# Patient Record
Sex: FEMALE | Race: OTHER
[De-identification: names, ages, dates, MRNs, and addresses within clinical notes are randomized per-mention and may not be internally consistent; named-entity substitution may affect disease eponyms.]

---

## 2020-04-09 ENCOUNTER — HOSPITAL ENCOUNTER (INPATIENT)
Dept: HOSPITAL 61 - ER | Age: 25
LOS: 3 days | Discharge: HOME | DRG: 419 | End: 2020-04-12
Attending: INTERNAL MEDICINE | Admitting: INTERNAL MEDICINE
Payer: SELF-PAY

## 2020-04-09 VITALS — SYSTOLIC BLOOD PRESSURE: 114 MMHG | DIASTOLIC BLOOD PRESSURE: 63 MMHG

## 2020-04-09 VITALS — DIASTOLIC BLOOD PRESSURE: 66 MMHG | SYSTOLIC BLOOD PRESSURE: 106 MMHG

## 2020-04-09 VITALS — SYSTOLIC BLOOD PRESSURE: 116 MMHG | DIASTOLIC BLOOD PRESSURE: 60 MMHG

## 2020-04-09 VITALS — DIASTOLIC BLOOD PRESSURE: 66 MMHG | SYSTOLIC BLOOD PRESSURE: 114 MMHG

## 2020-04-09 VITALS — DIASTOLIC BLOOD PRESSURE: 49 MMHG | SYSTOLIC BLOOD PRESSURE: 111 MMHG

## 2020-04-09 VITALS — SYSTOLIC BLOOD PRESSURE: 113 MMHG | DIASTOLIC BLOOD PRESSURE: 57 MMHG

## 2020-04-09 VITALS — DIASTOLIC BLOOD PRESSURE: 61 MMHG | SYSTOLIC BLOOD PRESSURE: 110 MMHG

## 2020-04-09 VITALS — DIASTOLIC BLOOD PRESSURE: 62 MMHG | SYSTOLIC BLOOD PRESSURE: 119 MMHG

## 2020-04-09 VITALS — DIASTOLIC BLOOD PRESSURE: 53 MMHG | SYSTOLIC BLOOD PRESSURE: 105 MMHG

## 2020-04-09 VITALS — BODY MASS INDEX: 30.49 KG/M2 | WEIGHT: 178.57 LBS | HEIGHT: 64 IN

## 2020-04-09 VITALS — SYSTOLIC BLOOD PRESSURE: 108 MMHG | DIASTOLIC BLOOD PRESSURE: 65 MMHG

## 2020-04-09 VITALS — DIASTOLIC BLOOD PRESSURE: 65 MMHG | SYSTOLIC BLOOD PRESSURE: 113 MMHG

## 2020-04-09 VITALS — SYSTOLIC BLOOD PRESSURE: 111 MMHG | DIASTOLIC BLOOD PRESSURE: 49 MMHG

## 2020-04-09 VITALS — SYSTOLIC BLOOD PRESSURE: 112 MMHG | DIASTOLIC BLOOD PRESSURE: 68 MMHG

## 2020-04-09 VITALS — DIASTOLIC BLOOD PRESSURE: 65 MMHG | SYSTOLIC BLOOD PRESSURE: 109 MMHG

## 2020-04-09 DIAGNOSIS — Z79.899: ICD-10-CM

## 2020-04-09 DIAGNOSIS — K76.0: ICD-10-CM

## 2020-04-09 DIAGNOSIS — Z82.49: ICD-10-CM

## 2020-04-09 DIAGNOSIS — K80.00: Primary | ICD-10-CM

## 2020-04-09 LAB
ALBUMIN SERPL-MCNC: 3.5 G/DL (ref 3.4–5)
ALBUMIN/GLOB SERPL: 1 {RATIO} (ref 1–1.7)
ALP SERPL-CCNC: 86 U/L (ref 46–116)
ALT SERPL-CCNC: 37 U/L (ref 14–59)
ANION GAP SERPL CALC-SCNC: 8 MMOL/L (ref 6–14)
APTT PPP: YELLOW S
AST SERPL-CCNC: 43 U/L (ref 15–37)
BACTERIA #/AREA URNS HPF: 0 /HPF
BASOPHILS # BLD AUTO: 0.1 X10^3/UL (ref 0–0.2)
BASOPHILS NFR BLD: 1 % (ref 0–3)
BILIRUB SERPL-MCNC: 0.2 MG/DL (ref 0.2–1)
BILIRUB UR QL STRIP: NEGATIVE
BUN SERPL-MCNC: 12 MG/DL (ref 7–20)
BUN/CREAT SERPL: 15 (ref 6–20)
CALCIUM SERPL-MCNC: 8.4 MG/DL (ref 8.5–10.1)
CHLORIDE SERPL-SCNC: 106 MMOL/L (ref 98–107)
CO2 SERPL-SCNC: 25 MMOL/L (ref 21–32)
CREAT SERPL-MCNC: 0.8 MG/DL (ref 0.6–1)
EOSINOPHIL NFR BLD: 0.2 X10^3/UL (ref 0–0.7)
EOSINOPHIL NFR BLD: 2 % (ref 0–3)
ERYTHROCYTE [DISTWIDTH] IN BLOOD BY AUTOMATED COUNT: 15.8 % (ref 11.5–14.5)
FIBRINOGEN PPP-MCNC: CLEAR MG/DL
GFR SERPLBLD BASED ON 1.73 SQ M-ARVRAT: 89.7 ML/MIN
GLOBULIN SER-MCNC: 3.4 G/DL (ref 2.2–3.8)
GLUCOSE SERPL-MCNC: 98 MG/DL (ref 70–99)
HCT VFR BLD CALC: 36.1 % (ref 36–47)
HGB BLD-MCNC: 11.9 G/DL (ref 12–15.5)
LIPASE: 184 U/L (ref 73–393)
LYMPHOCYTES # BLD: 2.8 X10^3/UL (ref 1–4.8)
LYMPHOCYTES NFR BLD AUTO: 34 % (ref 24–48)
MCH RBC QN AUTO: 28 PG (ref 25–35)
MCHC RBC AUTO-ENTMCNC: 33 G/DL (ref 31–37)
MCV RBC AUTO: 86 FL (ref 79–100)
MONO #: 0.6 X10^3/UL (ref 0–1.1)
MONOCYTES NFR BLD: 8 % (ref 0–9)
NEUT #: 4.5 X10^3/UL (ref 1.8–7.7)
NEUTROPHILS NFR BLD AUTO: 55 % (ref 31–73)
NITRITE UR QL STRIP: NEGATIVE
PH UR STRIP: 7.5 [PH]
PLATELET # BLD AUTO: 158 X10^3/UL (ref 140–400)
POTASSIUM SERPL-SCNC: 3.6 MMOL/L (ref 3.5–5.1)
PROT SERPL-MCNC: 6.9 G/DL (ref 6.4–8.2)
PROT UR STRIP-MCNC: NEGATIVE MG/DL
RBC # BLD AUTO: 4.22 X10^6/UL (ref 3.5–5.4)
RBC #/AREA URNS HPF: 0 /HPF (ref 0–2)
SODIUM SERPL-SCNC: 139 MMOL/L (ref 136–145)
SQUAMOUS #/AREA URNS LPF: (no result) /LPF
U PREG PATIENT: NEGATIVE
UROBILINOGEN UR-MCNC: 0.2 MG/DL
WBC # BLD AUTO: 8.1 X10^3/UL (ref 4–11)
WBC #/AREA URNS HPF: (no result) /HPF (ref 0–4)

## 2020-04-09 PROCEDURE — G0378 HOSPITAL OBSERVATION PER HR: HCPCS

## 2020-04-09 PROCEDURE — 99285 EMERGENCY DEPT VISIT HI MDM: CPT

## 2020-04-09 PROCEDURE — BF101ZZ FLUOROSCOPY OF BILE DUCTS USING LOW OSMOLAR CONTRAST: ICD-10-PCS | Performed by: SURGERY

## 2020-04-09 PROCEDURE — 85379 FIBRIN DEGRADATION QUANT: CPT

## 2020-04-09 PROCEDURE — 85025 COMPLETE CBC W/AUTO DIFF WBC: CPT

## 2020-04-09 PROCEDURE — 88304 TISSUE EXAM BY PATHOLOGIST: CPT

## 2020-04-09 PROCEDURE — 74300 X-RAY BILE DUCTS/PANCREAS: CPT

## 2020-04-09 PROCEDURE — 96374 THER/PROPH/DIAG INJ IV PUSH: CPT

## 2020-04-09 PROCEDURE — 93005 ELECTROCARDIOGRAM TRACING: CPT

## 2020-04-09 PROCEDURE — 81025 URINE PREGNANCY TEST: CPT

## 2020-04-09 PROCEDURE — 80053 COMPREHEN METABOLIC PANEL: CPT

## 2020-04-09 PROCEDURE — G0238 OTH RESP PROC, INDIV: HCPCS

## 2020-04-09 PROCEDURE — 87340 HEPATITIS B SURFACE AG IA: CPT

## 2020-04-09 PROCEDURE — 86038 ANTINUCLEAR ANTIBODIES: CPT

## 2020-04-09 PROCEDURE — 86705 HEP B CORE ANTIBODY IGM: CPT

## 2020-04-09 PROCEDURE — A7015 AEROSOL MASK USED W NEBULIZE: HCPCS

## 2020-04-09 PROCEDURE — 86709 HEPATITIS A IGM ANTIBODY: CPT

## 2020-04-09 PROCEDURE — 81001 URINALYSIS AUTO W/SCOPE: CPT

## 2020-04-09 PROCEDURE — 0FT44ZZ RESECTION OF GALLBLADDER, PERCUTANEOUS ENDOSCOPIC APPROACH: ICD-10-PCS | Performed by: SURGERY

## 2020-04-09 PROCEDURE — 96375 TX/PRO/DX INJ NEW DRUG ADDON: CPT

## 2020-04-09 PROCEDURE — 80076 HEPATIC FUNCTION PANEL: CPT

## 2020-04-09 PROCEDURE — 83690 ASSAY OF LIPASE: CPT

## 2020-04-09 PROCEDURE — 86803 HEPATITIS C AB TEST: CPT

## 2020-04-09 PROCEDURE — 36415 COLL VENOUS BLD VENIPUNCTURE: CPT

## 2020-04-09 PROCEDURE — 76705 ECHO EXAM OF ABDOMEN: CPT

## 2020-04-09 RX ADMIN — HYDROMORPHONE HYDROCHLORIDE PRN MG: 2 INJECTION INTRAMUSCULAR; INTRAVENOUS; SUBCUTANEOUS at 19:29

## 2020-04-09 RX ADMIN — BACITRACIN SCH MLS/HR: 5000 INJECTION, POWDER, FOR SOLUTION INTRAMUSCULAR at 12:21

## 2020-04-09 RX ADMIN — SODIUM CHLORIDE AND POTASSIUM CHLORIDE SCH MLS/HR: 4.5; 1.49 INJECTION, SOLUTION INTRAVENOUS at 13:37

## 2020-04-09 RX ADMIN — MORPHINE SULFATE PRN MG: 4 INJECTION, SOLUTION INTRAMUSCULAR; INTRAVENOUS at 05:17

## 2020-04-09 RX ADMIN — PIPERACILLIN SODIUM AND TAZOBACTAM SODIUM SCH MLS/HR: 3; .375 INJECTION, POWDER, LYOPHILIZED, FOR SOLUTION INTRAVENOUS at 17:52

## 2020-04-09 RX ADMIN — MORPHINE SULFATE PRN MG: 4 INJECTION, SOLUTION INTRAMUSCULAR; INTRAVENOUS at 03:08

## 2020-04-09 RX ADMIN — PIPERACILLIN SODIUM AND TAZOBACTAM SODIUM SCH MLS/HR: 3; .375 INJECTION, POWDER, LYOPHILIZED, FOR SOLUTION INTRAVENOUS at 03:05

## 2020-04-09 RX ADMIN — PIPERACILLIN SODIUM AND TAZOBACTAM SODIUM SCH MLS/HR: 3; .375 INJECTION, POWDER, LYOPHILIZED, FOR SOLUTION INTRAVENOUS at 06:41

## 2020-04-09 RX ADMIN — OXYCODONE HYDROCHLORIDE AND ACETAMINOPHEN PRN TAB: 5; 325 TABLET ORAL at 21:57

## 2020-04-09 RX ADMIN — BACITRACIN SCH MLS/HR: 5000 INJECTION, POWDER, FOR SOLUTION INTRAMUSCULAR at 06:40

## 2020-04-09 RX ADMIN — BACITRACIN SCH MLS/HR: 5000 INJECTION, POWDER, FOR SOLUTION INTRAMUSCULAR at 03:05

## 2020-04-09 RX ADMIN — BACITRACIN SCH MLS/HR: 5000 INJECTION, POWDER, FOR SOLUTION INTRAMUSCULAR at 21:52

## 2020-04-09 RX ADMIN — FENTANYL CITRATE PRN MCG: 50 INJECTION INTRAMUSCULAR; INTRAVENOUS at 12:29

## 2020-04-09 RX ADMIN — DOCUSATE SODIUM SCH MG: 100 CAPSULE, LIQUID FILLED ORAL at 21:52

## 2020-04-09 RX ADMIN — HYDROMORPHONE HYDROCHLORIDE PRN MG: 2 INJECTION INTRAMUSCULAR; INTRAVENOUS; SUBCUTANEOUS at 22:54

## 2020-04-09 RX ADMIN — FENTANYL CITRATE PRN MCG: 50 INJECTION INTRAMUSCULAR; INTRAVENOUS at 10:23

## 2020-04-09 RX ADMIN — PIPERACILLIN SODIUM AND TAZOBACTAM SODIUM SCH MLS/HR: 3; .375 INJECTION, POWDER, LYOPHILIZED, FOR SOLUTION INTRAVENOUS at 23:28

## 2020-04-09 RX ADMIN — ONDANSETRON PRN MG: 2 INJECTION INTRAMUSCULAR; INTRAVENOUS at 13:47

## 2020-04-09 RX ADMIN — PIPERACILLIN SODIUM AND TAZOBACTAM SODIUM SCH MLS/HR: 3; .375 INJECTION, POWDER, LYOPHILIZED, FOR SOLUTION INTRAVENOUS at 12:06

## 2020-04-09 RX ADMIN — HYDROMORPHONE HYDROCHLORIDE PRN MG: 2 INJECTION INTRAMUSCULAR; INTRAVENOUS; SUBCUTANEOUS at 13:37

## 2020-04-09 RX ADMIN — FENTANYL CITRATE PRN MCG: 50 INJECTION INTRAMUSCULAR; INTRAVENOUS at 12:49

## 2020-04-09 RX ADMIN — ENOXAPARIN SODIUM SCH MG: 40 INJECTION SUBCUTANEOUS at 21:51

## 2020-04-09 RX ADMIN — SODIUM CHLORIDE AND POTASSIUM CHLORIDE SCH MLS/HR: 4.5; 1.49 INJECTION, SOLUTION INTRAVENOUS at 23:30

## 2020-04-09 NOTE — PHYS DOC
Past Medical History


Past Medical History:  No Pertinent History


Past Surgical History:  No Surgical History


Smoking Status:  Never Smoker


Alcohol Use:  None





General Adult


EDM:


Chief Complaint:  CHEST PAIN





HPI:


HPI:





Patient is a 24  year old female who is 7 months postpartum who presents with 

acute onset epigastric/right upper quadrant. Rating to right shoulder blade 

starting every 5 minutes prior to ED arrival. Patient was sitting time symptoms 

began. Pain is described as sharp, rated moderate to severe and is associated 

with nausea and vomiting. No fevers chills or sweats. No shortness of breath. 

Patient ate a burger hours prior to symptom onset. Denies history of peptic 

ulcer disease, gallbladder disease or gastritis. No other acute symptoms or 

complaints. []





Review of Systems:


Review of Systems:


ROS as per HPI.  All othe ROS are negative.





Heart Score:


Risk Factors:


Risk Factors:  DM, Current or recent (<one month) smoker, HTN, HLP, family 

history of CAD, obesity.


Risk Scores:


Score 0 - 3:  2.5% MACE over next 6 weeks - Discharge Home


Score 4 - 6:  20.3% MACE over next 6 weeks - Admit for Clinical Observation


Score 7 - 10:  72.7% MACE over next 6 weeks - Early Invasive Strategies





Current Medications:





Current Medications








 Medications


  (Trade)  Dose


 Ordered  Sig/Mello  Start Time


 Stop Time Status Last Admin


Dose Admin


 


 Famotidine


  (Pepcid)  20 mg  1X  ONCE  4/9/20 01:30


 4/9/20 01:31 DC 4/9/20 01:21


20 MG


 


 Fentanyl Citrate


  (Fentanyl 2ml


 Vial)  75 mcg  1X  ONCE  4/9/20 01:30


 4/9/20 01:34 DC 4/9/20 01:30


75 MCG


 


 Morphine Sulfate


  (Morphine


 Sulfate)  4 mg  PRN Q2HR  PRN  4/9/20 03:00


 4/10/20 02:59   





 


 Multi-Ingredient


 Mouthwash/Gargle


  (Gi Cocktail)  20 ml  1X  ONCE  4/9/20 01:30


 4/9/20 01:31 DC 4/9/20 01:21


20 ML


 


 Ondansetron HCl


  (Zofran Odt)  4 mg  1X  ONCE  4/9/20 01:30


 4/9/20 01:31 DC 4/9/20 01:21


4 MG


 


 Ondansetron HCl


  (Zofran)  4 mg  PRN Q8HRS  PRN  4/9/20 03:00


 4/10/20 02:59   





 


 Piperacillin Sod/


 Tazobactam Sod


  (Zosyn Per


 Pharmacy)  1 each  PRN DAILY  PRN  4/9/20 03:00


     





 


 Piperacillin Sod/


 Tazobactam Sod


 3.375 gm/Sodium


 Chloride  50 ml @ 


 100 mls/hr  Q6HRS  4/9/20 03:00


    4/9/20 03:05


100 MLS/HR


 


 Sodium Chloride  1,000 ml @ 


 125 mls/hr  Q8H  4/9/20 03:00


 4/10/20 02:59  4/9/20 03:05


125 MLS/HR











Allergies:


Allergies:





Allergies








Coded Allergies Type Severity Reaction Last Updated Verified


 


  No Known Drug Allergies    4/9/20 No











Physical Exam:


PE:





Constitutional: Well developed, well nourished, no acute distress, non-toxic 

appearance. []


HENT: Normocephalic, atraumatic, bilateral external ears normal, oropharynx 

moist, no oral exudates, nose normal. []


Eyes: PERRLA, EOMI, conjunctiva normal, no discharge. [] 


Neck: Normal range of motion, no tenderness. [] 


Cardiovascular:Heart rate regular rhythhm []


Lungs & Thorax:  Bilateral breath sounds clear to auscultation []


Abdomen: Bowel sounds normal, soft, RUQ tenderness to palpation.. [] 


Skin: Warm, dry, no erythema, no rash. [] 


Back: No tenderness, no CVA tenderness. [] 


Extremities: No tenderness, no cyanosis, no clubbing, ROM intact, no edema. [] 


Neurologic: Alert and oriented X 3, normal motor function, normal sensory 

function, no focal deficits noted. []


Psychologic: Affect normal, judgement normal, mood normal. []





Current Patient Data:


Labs:





                                Laboratory Tests








Test


 4/9/20


01:12 4/9/20


01:35


 


White Blood Count


 8.1 x10^3/uL


(4.0-11.0) 





 


Red Blood Count


 4.22 x10^6/uL


(3.50-5.40) 





 


Hemoglobin


 11.9 g/dL


(12.0-15.5)  L 





 


Hematocrit


 36.1 %


(36.0-47.0) 





 


Mean Corpuscular Volume


 86 fL ()


 





 


Mean Corpuscular Hemoglobin 28 pg (25-35)   


 


Mean Corpuscular Hemoglobin


Concent 33 g/dL


(31-37) 





 


Red Cell Distribution Width


 15.8 %


(11.5-14.5)  H 





 


Platelet Count


 158 x10^3/uL


(140-400) 





 


Neutrophils (%) (Auto) 55 % (31-73)   


 


Lymphocytes (%) (Auto) 34 % (24-48)   


 


Monocytes (%) (Auto) 8 % (0-9)   


 


Eosinophils (%) (Auto) 2 % (0-3)   


 


Basophils (%) (Auto) 1 % (0-3)   


 


Neutrophils # (Auto)


 4.5 x10^3/uL


(1.8-7.7) 





 


Lymphocytes # (Auto)


 2.8 x10^3/uL


(1.0-4.8) 





 


Monocytes # (Auto)


 0.6 x10^3/uL


(0.0-1.1) 





 


Eosinophils # (Auto)


 0.2 x10^3/uL


(0.0-0.7) 





 


Basophils # (Auto)


 0.1 x10^3/uL


(0.0-0.2) 





 


D-Dimer (Kassi)


 < 0.27


ug/mlFEU 





 


Sodium Level


 139 mmol/L


(136-145) 





 


Potassium Level


 3.6 mmol/L


(3.5-5.1) 





 


Chloride Level


 106 mmol/L


() 





 


Carbon Dioxide Level


 25 mmol/L


(21-32) 





 


Anion Gap 8 (6-14)   


 


Blood Urea Nitrogen


 12 mg/dL


(7-20) 





 


Creatinine


 0.8 mg/dL


(0.6-1.0) 





 


Estimated GFR


(Cockcroft-Gault) 89.7  


 





 


BUN/Creatinine Ratio 15 (6-20)   


 


Glucose Level


 98 mg/dL


(70-99) 





 


Calcium Level


 8.4 mg/dL


(8.5-10.1)  L 





 


Total Bilirubin


 0.2 mg/dL


(0.2-1.0) 





 


Aspartate Amino Transferase


(AST) 43 U/L (15-37)


H 





 


Alanine Aminotransferase (ALT)


 37 U/L (14-59)


 





 


Alkaline Phosphatase


 86 U/L


() 





 


Total Protein


 6.9 g/dL


(6.4-8.2) 





 


Albumin


 3.5 g/dL


(3.4-5.0) 





 


Albumin/Globulin Ratio 1.0 (1.0-1.7)   


 


Lipase


 184 U/L


() 





 


Urine Collection Type  Unknown  


 


Urine Color  Yellow  


 


Urine Clarity  Clear  


 


Urine pH


 


 7.5 (<5.0-8.0)





 


Urine Specific Gravity


 


 1.010


(1.000-1.030)


 


Urine Protein


 


 Negative mg/dL


(NEG-TRACE)


 


Urine Glucose (UA)


 


 Negative mg/dL


(NEG)


 


Urine Ketones (Stick)


 


 Negative mg/dL


(NEG)


 


Urine Blood


 


 Negative (NEG)





 


Urine Nitrite


 


 Negative (NEG)





 


Urine Bilirubin


 


 Negative (NEG)





 


Urine Urobilinogen Dipstick


 


 0.2 mg/dL (0.2


mg/dL)


 


Urine Leukocyte Esterase


 


 Negative (NEG)





 


Urine RBC  0 /HPF (0-2)  


 


Urine WBC


 


 1-4 /HPF (0-4)





 


Urine Squamous Epithelial


Cells 


 Many /LPF  





 


Urine Bacteria


 


 0 /HPF (0-FEW)








                                Laboratory Tests


4/9/20 01:12








                                Laboratory Tests


4/9/20 01:12








Vital Signs:





                                   Vital Signs








  Date Time  Temp Pulse Resp B/P (MAP) Pulse Ox O2 Delivery O2 Flow Rate FiO2


 


4/9/20 01:30   18  100 Room Air  


 


4/9/20 01:09 98.2 99  115/60 (78)    





 98.2       











EKG:


EKG:


[ekg: reviewed]





Radiology/Procedures:


Radiology/Procedures:


[US abd limited: Large: Moderate stones, CBD 7.4 mm, wall thickness with. 

Cholecystic fluid present.]





Course & Med Decision Making:


Course & Med Decision Making


Pertinent Labs and Imaging studies reviewed. (See chart for details)





[Acute cholecystitis. IV antibiotics, repeat pain medications given. Will admit 

to the hospitalist service with general surgical consult.]





Dragon Disclaimer:


Dragon Disclaimer:


This electronic medical record was generated, in whole or in part, using a voice

recognition dictation system.





Departure


Departure


Impression:  


   Primary Impression:  


   Acute cholecystitis


Disposition:  09 ADMITTED AS INPATIENT


Condition:  LEFT WITHOUT BEING SEEN


Referrals:  


NO PCP (PCP)











PAVAN JULIEN DO                    Apr 9, 2020 03:14

## 2020-04-09 NOTE — PDOC2
LEON BRAVO APRN 4/9/20 0857:


CONSULT


Date of Consult


Date of Consult


DATE: 4/9/20 


TIME: 08:49





Reason for Consult


Reason for Consult:


cholecystitis





Referring Physician


Referring Physician:


ER





Identification/Chief Complaint


Chief Complaint


abdominal pain





Source


Source:  Chart review, Patient





History of Present Illness


Reason for Visit:


RUQ pain started yesterday, radiated to back.  Associated nausea and emesis.  + 

chills, has happened before, however not this severe or long lasting





Past Medical History


Past Medical History


no pertinent hx





Past Surgical History


Past Surgical History:  No pertinent history





Family History


Family History:  Other (noncontributory )





Social History


No


ALCOHOL:  none


Drugs:  None





Current Medications


Current Medications





Current Medications


Famotidine (Pepcid) 20 mg 1X  ONCE PO  Last administered on 4/9/20at 01:21;  

Start 4/9/20 at 01:30;  Stop 4/9/20 at 01:31;  Status DC


Ondansetron HCl (Zofran Odt) 4 mg 1X  ONCE PO  Last administered on 4/9/20at 

01:21;  Start 4/9/20 at 01:30;  Stop 4/9/20 at 01:31;  Status DC


Multi-Ingredient Mouthwash/Gargle (Gi Cocktail) 20 ml 1X  ONCE SWSW  Last 

administered on 4/9/20at 01:21;  Start 4/9/20 at 01:30;  Stop 4/9/20 at 01:31;  

Status DC


Fentanyl Citrate (Fentanyl 2ml Vial) 75 mcg 1X  ONCE IVP  Last administered on 

4/9/20at 01:30;  Start 4/9/20 at 01:30;  Stop 4/9/20 at 01:34;  Status DC


Piperacillin Sod/ Tazobactam Sod (Zosyn Per Pharmacy) 1 each PRN DAILY  PRN [facility] 

SEE COMMENTS;  Start 4/9/20 at 03:00


Piperacillin Sod/ Tazobactam Sod 3.375 gm/Sodium Chloride 50 ml @  100 mls/hr 

Q6HRS IV  Last administered on 4/9/20at 06:41;  Start 4/9/20 at 03:00


Ondansetron HCl (Zofran) 4 mg PRN Q8HRS  PRN IV NAUSEA/VOMITING 1ST CHOICE Last 

administered on 4/9/20at 03:07;  Start 4/9/20 at 03:00;  Stop 4/10/20 at 02:59


Morphine Sulfate (Morphine Sulfate) 4 mg PRN Q2HR  PRN IV SEVERE PAIN 7-10 Last 

administered on 4/9/20at 05:17;  Start 4/9/20 at 03:00;  Stop 4/10/20 at 02:59


Sodium Chloride 1,000 ml @  125 mls/hr Q8H IV  Last administered on 4/9/20at 

06:40;  Start 4/9/20 at 03:00;  Stop 4/10/20 at 02:59


Ondansetron HCl (Zofran) 4 mg PRN Q6HRS  PRN IV NAUSEA/VOMITING;  Start 4/9/20 

at 08:45;  Stop 4/9/20 at 20:00


Fentanyl Citrate (Fentanyl 2ml Vial) 25 mcg PRN Q5MIN  PRN IV MILD PAIN 1-3;  

Start 4/9/20 at 08:45;  Stop 4/9/20 at 20:00


Fentanyl Citrate (Fentanyl 2ml Vial) 50 mcg PRN Q5MIN  PRN IV MODERATE TO SEVERE

PAIN;  Start 4/9/20 at 08:45;  Stop 4/9/20 at 20:00


Morphine Sulfate (Morphine Sulfate) 1 mg PRN Q10MIN  PRN IV SEVERE PAIN 7-10;  

Start 4/9/20 at 08:45;  Stop 4/10/20 at 08:44;  Status UNV


Ringer's Solution 1,000 ml @  30 mls/hr Q24H IV ;  Start 4/9/20 at 08:43;  Stop 

4/9/20 at 20:42;  Status UNV


Hydromorphone HCl (Dilaudid) 0.5 mg PRN Q10MIN  PRN IV SEV PAIN, Second choice; 

Start 4/9/20 at 08:45;  Stop 4/10/20 at 08:44;  Status UNV


Prochlorperazine Edisylate (Compazine) 5 mg PACU PRN  PRN IV NAUSEA, MRX1;  

Start 4/9/20 at 08:45;  Stop 4/10/20 at 08:44;  Status UNV





Allergies


Allergies:  


Coded Allergies:  


     No Known Drug Allergies (Unverified , 4/9/20)





ROS


General:  YES: Chills; 


   No: Other (fevers )


PSYCHOLOGICAL ROS:  No: Anxiety, Depression


Eyes:  No Blurry vision, No Double vision


HEENT:  No: Heacaches, Sore Throat


Hematological and Lymphatic:  No: Bleeding Problems, Blood Clots


Respiratory:  No: Cough, SOB with excertion


Cardiovascular:  No Chest Pain, No Palpitations


Gastrointestinal:  Yes Other (see hpi)


Genitourinary:  No Dysuria, No Hematuria


Musculoskeletal:  No Joint Pain, No Muscle Pain


Neurological:  No Impaired Coord/balance, No Numbness/Tingling


Skin:  No Pruritus, No Rash





Physical Exam


General:  Alert, Oriented X3, Cooperative, No acute distress


HEENT:  PERRLA, Mucous membr. moist/pink


Lungs:  Clear to auscultation, Normal air movement


Heart:  Regular rate, Normal S1, Normal S2


Abdomen:  Soft, Other (RUQ TTP)


Extremities:  No clubbing, No cyanosis


Skin:  No rashes, No breakdown


Neuro:  Normal gait, Normal speech


Psych/Mental Status:  Mental status NL, Mood NL


MUSCULOSKELETAL:  No deformity, No swelling





Vitals


VITALS





Vital Signs








  Date Time  Temp Pulse Resp B/P (MAP) Pulse Ox O2 Delivery O2 Flow Rate FiO2


 


4/9/20 07:30 98.2 85 18 114/63 (80) 97 Room Air  





 98.2       











Labs


Labs





Laboratory Tests








Test


 4/9/20


01:12 4/9/20


01:35


 


White Blood Count


 8.1 x10^3/uL


(4.0-11.0) 





 


Red Blood Count


 4.22 x10^6/uL


(3.50-5.40) 





 


Hemoglobin


 11.9 g/dL


(12.0-15.5) 





 


Hematocrit


 36.1 %


(36.0-47.0) 





 


Mean Corpuscular Volume 86 fL ()  


 


Mean Corpuscular Hemoglobin 28 pg (25-35)  


 


Mean Corpuscular Hemoglobin


Concent 33 g/dL


(31-37) 





 


Red Cell Distribution Width


 15.8 %


(11.5-14.5) 





 


Platelet Count


 158 x10^3/uL


(140-400) 





 


Neutrophils (%) (Auto) 55 % (31-73)  


 


Lymphocytes (%) (Auto) 34 % (24-48)  


 


Monocytes (%) (Auto) 8 % (0-9)  


 


Eosinophils (%) (Auto) 2 % (0-3)  


 


Basophils (%) (Auto) 1 % (0-3)  


 


Neutrophils # (Auto)


 4.5 x10^3/uL


(1.8-7.7) 





 


Lymphocytes # (Auto)


 2.8 x10^3/uL


(1.0-4.8) 





 


Monocytes # (Auto)


 0.6 x10^3/uL


(0.0-1.1) 





 


Eosinophils # (Auto)


 0.2 x10^3/uL


(0.0-0.7) 





 


Basophils # (Auto)


 0.1 x10^3/uL


(0.0-0.2) 





 


D-Dimer (Kassi)


 < 0.27


ug/mlFEU 





 


Sodium Level


 139 mmol/L


(136-145) 





 


Potassium Level


 3.6 mmol/L


(3.5-5.1) 





 


Chloride Level


 106 mmol/L


() 





 


Carbon Dioxide Level


 25 mmol/L


(21-32) 





 


Anion Gap 8 (6-14)  


 


Blood Urea Nitrogen


 12 mg/dL


(7-20) 





 


Creatinine


 0.8 mg/dL


(0.6-1.0) 





 


Estimated GFR


(Cockcroft-Gault) 89.7 


 





 


BUN/Creatinine Ratio 15 (6-20)  


 


Glucose Level


 98 mg/dL


(70-99) 





 


Calcium Level


 8.4 mg/dL


(8.5-10.1) 





 


Total Bilirubin


 0.2 mg/dL


(0.2-1.0) 





 


Aspartate Amino Transf


(AST/SGOT) 43 U/L (15-37) 


 





 


Alanine Aminotransferase


(ALT/SGPT) 37 U/L (14-59) 


 





 


Alkaline Phosphatase


 86 U/L


() 





 


Total Protein


 6.9 g/dL


(6.4-8.2) 





 


Albumin


 3.5 g/dL


(3.4-5.0) 





 


Albumin/Globulin Ratio 1.0 (1.0-1.7)  


 


Lipase


 184 U/L


() 





 


Urine Collection Type  Unknown 


 


Urine Color  Yellow 


 


Urine Clarity  Clear 


 


Urine pH  7.5 (<5.0-8.0) 


 


Urine Specific Gravity


 


 1.010


(1.000-1.030)


 


Urine Protein


 


 Negative mg/dL


(NEG-TRACE)


 


Urine Glucose (UA)


 


 Negative mg/dL


(NEG)


 


Urine Ketones (Stick)


 


 Negative mg/dL


(NEG)


 


Urine Blood  Negative (NEG) 


 


Urine Nitrite  Negative (NEG) 


 


Urine Bilirubin  Negative (NEG) 


 


Urine Urobilinogen Dipstick


 


 0.2 mg/dL (0.2


mg/dL)


 


Urine Leukocyte Esterase  Negative (NEG) 


 


Urine RBC  0 /HPF (0-2) 


 


Urine WBC  1-4 /HPF (0-4) 


 


Urine Squamous Epithelial


Cells 


 Many /LPF 





 


Urine Bacteria  0 /HPF (0-FEW) 








Laboratory Tests








Test


 4/9/20


01:12 4/9/20


01:35


 


White Blood Count


 8.1 x10^3/uL


(4.0-11.0) 





 


Red Blood Count


 4.22 x10^6/uL


(3.50-5.40) 





 


Hemoglobin


 11.9 g/dL


(12.0-15.5) 





 


Hematocrit


 36.1 %


(36.0-47.0) 





 


Mean Corpuscular Volume 86 fL ()  


 


Mean Corpuscular Hemoglobin 28 pg (25-35)  


 


Mean Corpuscular Hemoglobin


Concent 33 g/dL


(31-37) 





 


Red Cell Distribution Width


 15.8 %


(11.5-14.5) 





 


Platelet Count


 158 x10^3/uL


(140-400) 





 


Neutrophils (%) (Auto) 55 % (31-73)  


 


Lymphocytes (%) (Auto) 34 % (24-48)  


 


Monocytes (%) (Auto) 8 % (0-9)  


 


Eosinophils (%) (Auto) 2 % (0-3)  


 


Basophils (%) (Auto) 1 % (0-3)  


 


Neutrophils # (Auto)


 4.5 x10^3/uL


(1.8-7.7) 





 


Lymphocytes # (Auto)


 2.8 x10^3/uL


(1.0-4.8) 





 


Monocytes # (Auto)


 0.6 x10^3/uL


(0.0-1.1) 





 


Eosinophils # (Auto)


 0.2 x10^3/uL


(0.0-0.7) 





 


Basophils # (Auto)


 0.1 x10^3/uL


(0.0-0.2) 





 


D-Dimer (Kassi)


 < 0.27


ug/mlFEU 





 


Sodium Level


 139 mmol/L


(136-145) 





 


Potassium Level


 3.6 mmol/L


(3.5-5.1) 





 


Chloride Level


 106 mmol/L


() 





 


Carbon Dioxide Level


 25 mmol/L


(21-32) 





 


Anion Gap 8 (6-14)  


 


Blood Urea Nitrogen


 12 mg/dL


(7-20) 





 


Creatinine


 0.8 mg/dL


(0.6-1.0) 





 


Estimated GFR


(Cockcroft-Gault) 89.7 


 





 


BUN/Creatinine Ratio 15 (6-20)  


 


Glucose Level


 98 mg/dL


(70-99) 





 


Calcium Level


 8.4 mg/dL


(8.5-10.1) 





 


Total Bilirubin


 0.2 mg/dL


(0.2-1.0) 





 


Aspartate Amino Transf


(AST/SGOT) 43 U/L (15-37) 


 





 


Alanine Aminotransferase


(ALT/SGPT) 37 U/L (14-59) 


 





 


Alkaline Phosphatase


 86 U/L


() 





 


Total Protein


 6.9 g/dL


(6.4-8.2) 





 


Albumin


 3.5 g/dL


(3.4-5.0) 





 


Albumin/Globulin Ratio 1.0 (1.0-1.7)  


 


Lipase


 184 U/L


() 





 


Urine Collection Type  Unknown 


 


Urine Color  Yellow 


 


Urine Clarity  Clear 


 


Urine pH  7.5 (<5.0-8.0) 


 


Urine Specific Gravity


 


 1.010


(1.000-1.030)


 


Urine Protein


 


 Negative mg/dL


(NEG-TRACE)


 


Urine Glucose (UA)


 


 Negative mg/dL


(NEG)


 


Urine Ketones (Stick)


 


 Negative mg/dL


(NEG)


 


Urine Blood  Negative (NEG) 


 


Urine Nitrite  Negative (NEG) 


 


Urine Bilirubin  Negative (NEG) 


 


Urine Urobilinogen Dipstick


 


 0.2 mg/dL (0.2


mg/dL)


 


Urine Leukocyte Esterase  Negative (NEG) 


 


Urine RBC  0 /HPF (0-2) 


 


Urine WBC  1-4 /HPF (0-4) 


 


Urine Squamous Epithelial


Cells 


 Many /LPF 





 


Urine Bacteria  0 /HPF (0-FEW) 











Assessment/Plan


Assessment/Plan


cholecystitis


plan lap maicol





RAJ SUH MD 4/9/20 0910:


CONSULT


Assessment/Plan


Assessment/Plan


I saw, interviewed and examined Loreta. Agree with above assessment. Explained

risks of cholecystectomy including but not limited to bleeding, infection, 

injury to surrounding structures requiring more surgery later, possible open 

procedure, need for a drain. 


Discussed non operative treatment. Questions answered. She would like to proceed





To OR today





Thanks for consult











LEON BRAVO             Apr 9, 2020 08:57


RAJ SUH MD                 Apr 9, 2020 09:10

## 2020-04-09 NOTE — EKG
[facility]

              [address] [place] [address]

Test Date:    2020               Test Time:    00:57:09

Pat Name:     SINDHU KAHN    Department:   

Patient ID:   [id number]           Room:         [facility]

Gender:       F                        Technician:   

:          [id number]               Requested By: PAVAN JULIEN

Order Number: [id number]           Reading MD:   Tor Naidu

                                 Measurements

Intervals                              Axis          

Rate:         105                      P:            61

MA:           136                      QRS:          7

QRSD:         86                       T:            28

QT:           346                                    

QTc:          461                                    

                           Interpretive Statements

SINUS TACHYCARDIA

NONSPECIFIC ST-T WAVE CHANGES.



Electronically Signed On 2020 9:28:04 CDT by Tor Naidu

## 2020-04-09 NOTE — RAD
CHOLANGIOGRAM INTRAOPERATIVE

 

History: Cholecystectomy.

 

Comparison: Ultrasound April 9, 2020

 

Technique/findings:

Contrast opacification of the cystic duct extending into the common bile 

duct and intrahepatic ducts. Normal tapering of the distal common bile 

duct. Contrast noted within the small bowel. Apparent filling defect 

within the distal common bile duct resolves on subsequent imaging.

 

See procedure note for further details.

 

Fluoroscopy time: 34.4 seconds

Number of fluoroscopic images: 7

 

Impression: 

1.  Intraoperative cholangiogram. No evidence of common bile duct 

obstruction.

 

Electronically signed by: Terrell Cota DO (4/9/2020 11:51 AM) [id number]

## 2020-04-09 NOTE — PDOC1
History and Physical


Date of Admission


Date of Admission


DATE: 4/9/20 


TIME: 11:31





Identification/Chief Complaint


Chief Complaint


 seen in er , 24  year old female who is 7 months postpartum who presented with 

acute onset epigastric/right upper quadrant.// shoulder blade starting every 5 

minutes prior to ED arrival.  Pain is described as sharp, rated moderate to 

severe and is associated with nausea and vomiting. No fevers chills or sweats. 

No shortness of breath.





She  had a burger hours prior to symptom onset.





Past Medical History


Past Medical History:  No Pertinent History


Past Surgical History:  No Surgical History


Smoking Status:  Never Smoker


Alcohol Use:  None








fhx htn


Cardiovascular:  No pertinent hx


GI:  No pertinent hx





Past Surgical History


Past Surgical History:  No pertinent history





Family History


Family History:  Hypertension, Other (noncontributory )





Social History


Smoke:  No


ALCOHOL:  none


Drugs:  None





Current Medications


Current Medications





Current Medications


Famotidine (Pepcid) 20 mg 1X  ONCE PO  Last administered on 4/9/20at 01:21;  

Start 4/9/20 at 01:30;  Stop 4/9/20 at 01:31;  Status DC


Ondansetron HCl (Zofran Odt) 4 mg 1X  ONCE PO  Last administered on 4/9/20at 

01:21;  Start 4/9/20 at 01:30;  Stop 4/9/20 at 01:31;  Status DC


Multi-Ingredient Mouthwash/Gargle (Gi Cocktail) 20 ml 1X  ONCE SWSW  Last 

administered on 4/9/20at 01:21;  Start 4/9/20 at 01:30;  Stop 4/9/20 at 01:31;  

Status DC


Fentanyl Citrate (Fentanyl 2ml Vial) 75 mcg 1X  ONCE IVP  Last administered on 

4/9/20at 01:30;  Start 4/9/20 at 01:30;  Stop 4/9/20 at 01:34;  Status DC


Piperacillin Sod/ Tazobactam Sod (Zosyn Per Pharmacy) 1 each PRN DAILY  PRN [facility] 

SEE COMMENTS;  Start 4/9/20 at 03:00


Piperacillin Sod/ Tazobactam Sod 3.375 gm/Sodium Chloride 50 ml @  100 mls/hr 

Q6HRS IV  Last administered on 4/9/20at 06:41;  Start 4/9/20 at 03:00


Ondansetron HCl (Zofran) 4 mg PRN Q8HRS  PRN IV NAUSEA/VOMITING 1ST CHOICE Last 

administered on 4/9/20at 03:07;  Start 4/9/20 at 03:00;  Stop 4/10/20 at 02:59


Morphine Sulfate (Morphine Sulfate) 4 mg PRN Q2HR  PRN IV SEVERE PAIN 7-10 Last 

administered on 4/9/20at 05:17;  Start 4/9/20 at 03:00;  Stop 4/10/20 at 02:59


Sodium Chloride 1,000 ml @  125 mls/hr Q8H IV  Last administered on 4/9/20at 

06:40;  Start 4/9/20 at 03:00;  Stop 4/10/20 at 02:59


Ondansetron HCl (Zofran) 4 mg PRN Q6HRS  PRN IV NAUSEA/VOMITING;  Start 4/9/20 

at 08:45;  Stop 4/9/20 at 20:00


Fentanyl Citrate (Fentanyl 2ml Vial) 25 mcg PRN Q5MIN  PRN IV MILD PAIN 1-3 Last

administered on 4/9/20at 10:23;  Start 4/9/20 at 08:45;  Stop 4/9/20 at 20:00


Fentanyl Citrate (Fentanyl 2ml Vial) 50 mcg PRN Q5MIN  PRN IV MODERATE TO SEVERE

PAIN;  Start 4/9/20 at 08:45;  Stop 4/9/20 at 20:00


Morphine Sulfate (Morphine Sulfate) 1 mg PRN Q10MIN  PRN IV SEVERE PAIN 7-10;  

Start 4/9/20 at 08:45;  Stop 4/9/20 at 20:00


Ringer's Solution 1,000 ml @  30 mls/hr Q24H IV ;  Start 4/9/20 at 08:43;  Stop 

4/9/20 at 20:42


Hydromorphone HCl (Dilaudid) 0.5 mg PRN Q10MIN  PRN IV SEV PAIN, Second choice; 

Start 4/9/20 at 08:45;  Stop 4/9/20 at 20:00


Prochlorperazine Edisylate (Compazine) 5 mg PACU PRN  PRN IV NAUSEA, MRX1 Last 

administered on 4/9/20at 10:19;  Start 4/9/20 at 08:45;  Stop 4/9/20 at 20:00


Dexamethasone Sodium Phosphate (Decadron) 4 mg STK-MED ONCE .ROUTE ;  Start 

4/9/20 at 10:45;  Stop 4/9/20 at 10:46;  Status DC


Famotidine (Pepcid Vial) 20 mg STK-MED ONCE .ROUTE ;  Start 4/9/20 at 10:45;  

Stop 4/9/20 at 10:46;  Status DC


Ondansetron HCl (Zofran) 4 mg STK-MED ONCE .ROUTE ;  Start 4/9/20 at 10:45;  

Stop 4/9/20 at 10:46;  Status DC


Fentanyl Citrate (Fentanyl 2ml Vial) 100 mcg STK-MED ONCE .ROUTE ;  Start 4/9/20

at 10:45;  Stop 4/9/20 at 10:46;  Status DC


Midazolam HCl (Versed) 2 mg STK-MED ONCE .ROUTE ;  Start 4/9/20 at 10:45;  Stop 

4/9/20 at 10:46;  Status DC


Bupivacaine HCl/ Epinephrine Bitart (Sensorcain-Epi 0.5%-1:567197 Mpf) 30 ml 

STK-MED ONCE .ROUTE ;  Start 4/9/20 at 10:49;  Stop 4/9/20 at 10:49;  Status DC


Glucagon (Glucagen) 1 mg STK-MED ONCE .ROUTE ;  Start 4/9/20 at 10:49;  Stop 

4/9/20 at 10:49;  Status DC


Iohexol (Omnipaque 300 Mg/ml) 50 ml STK-MED ONCE .ROUTE ;  Start 4/9/20 at 

10:49;  Stop 4/9/20 at 10:49;  Status DC


Cellulose (Surgicel Hemostat 4x8) 1 each STK-MED ONCE .ROUTE ;  Start 4/9/20 at 

10:49;  Stop 4/9/20 at 10:49;  Status DC





Allergies


Allergies:  


Coded Allergies:  


     No Known Drug Allergies (Unverified , 4/9/20)





ROS


Review of System


Review of Systems:


 14 pt ROS as per HPI.  All other ROS are negative.


PSYCHOLOGICAL ROS:  No: Anxiety, Behavioral Disorder, Concentration difficultie,

Decreased libido, Depression, Disorientation, Hallucinations, Hostility, 

Irritablity, Memory difficulties, Mood Swings, Obsessive thoughts, Physical 

abuse, Sexual abuse, Sleep disturbances, Suicidal ideation, Other


HEENT:  No: Heacaches, Visual Changes, Hearing change, Nasal congestion, Nasal 

discharge, Oral lesions, Sinus pain, Sore Throat, Epistaxis, Sneezing, Snoring, 

Tinnitus, Vertigo, Vocal changes, Other


ALLERGY AND IMMUNOLOGY:  No: Hives, Insect Bite Sensitivity, Itchy/Watery Eyes, 

Nasal Congestion, Post Nasal Drip, Seasonal Allergies, Other


Hematological and Lymphatic:  No: Bleeding Problems, Blood Clots, Blood 

Transfusions, Brusing, Night Sweats, Pallor, Swollen Lymph Nodes, Other


ENDOCRINE:  No: Breast Changes, Galactorrhea, Hair Pattern Changes, Hot Flashes,

Malaise/lethargy, Mood Swings, Palpitations, Polydipsia/polyuria, Skin Changes, 

Temperature Intolerance, Unexpected Weight Changes, Other


Respiratory:  No: Cough, Hemoptysis, Orthopnea, Pleuritic Pain, Shortness of 

breath, SOB with excertion, Sputum Changes, Stridor, Tachypnea, Wheezing, Other


Musculoskeletal:  No Gait Disturbance, No Joint Pain, No Joint Stiffness, No 

Joint Swelling, No Muscle Pain, No Muscular Weakness, No Pain In:, No Swelling 

In:, No Other


Neurological:  No Behavorial Changes, No Bowel/Bladder ControlChng, No 

Confusion, No Dizziness, No Gait Disturbance, No Headaches, No Impaired Coord

/balance, No Memory Loss, No Numbness/Tingling, No Seizures, No Speech Problems,

No Tremors, No Visual Changes, No Weakness, No Other





Physical Exam


Physical Exam





Physical Exam:


PE:





Constitutional: Well developed, well nourished, no acute distress, non-toxic 

appearance. []


HENT: Normocephalic, atraumatic, bilateral external ears normal, oropharynx 

moist, no oral exudates, nose normal. []


Eyes: PERRLA, EOMI, conjunctiva normal, no discharge. [] 


Neck: Normal range of motion, no tenderness. [] 


Cardiovascular:Heart rate regular rhythhm []


Lungs & Thorax:  Bilateral breath sounds clear to auscultation []


Abdomen: Bowel sounds normal, soft, RUQ tenderness to palpation.. [] 


Skin: Warm, dry, no erythema, no rash. [] 


Back: No tenderness, no CVA tenderness. [] 


Extremities: No tenderness, no cyanosis, no clubbing, ROM intact, no edema. [] 


Neurologic: Alert and oriented X 3, normal motor function, normal sensory 

function, no focal deficits noted. []


Psychologic: Affect normal, judgment normal, mood normal. []


General:  Alert, Oriented X3, Cooperative


HEENT:  Atraumatic, EOMI, Mucous membr. moist/pink


Lungs:  Clear to auscultation, Normal air movement


Heart:  RRR


Rectal Exam:  not examined


Extremities:  No cyanosis


Neuro:  Normal speech, Cranial nerves 3-12 NL


Psych/Mental Status:  Mental status NL, Mood NL





Vitals


Vitals





Vital Signs








  Date Time  Temp Pulse Resp B/P (MAP) Pulse Ox O2 Delivery O2 Flow Rate FiO2


 


4/9/20 10:23   20  100 Room Air  


 


4/9/20 10:07 98.1 82  123/57    





 98.1       











Labs


Labs





Laboratory Tests








Test


 4/9/20


01:12 4/9/20


01:35 4/9/20


08:49


 


White Blood Count


 8.1 x10^3/uL


(4.0-11.0) 


 





 


Red Blood Count


 4.22 x10^6/uL


(3.50-5.40) 


 





 


Hemoglobin


 11.9 g/dL


(12.0-15.5) 


 





 


Hematocrit


 36.1 %


(36.0-47.0) 


 





 


Mean Corpuscular Volume 86 fL ()   


 


Mean Corpuscular Hemoglobin 28 pg (25-35)   


 


Mean Corpuscular Hemoglobin


Concent 33 g/dL


(31-37) 


 





 


Red Cell Distribution Width


 15.8 %


(11.5-14.5) 


 





 


Platelet Count


 158 x10^3/uL


(140-400) 


 





 


Neutrophils (%) (Auto) 55 % (31-73)   


 


Lymphocytes (%) (Auto) 34 % (24-48)   


 


Monocytes (%) (Auto) 8 % (0-9)   


 


Eosinophils (%) (Auto) 2 % (0-3)   


 


Basophils (%) (Auto) 1 % (0-3)   


 


Neutrophils # (Auto)


 4.5 x10^3/uL


(1.8-7.7) 


 





 


Lymphocytes # (Auto)


 2.8 x10^3/uL


(1.0-4.8) 


 





 


Monocytes # (Auto)


 0.6 x10^3/uL


(0.0-1.1) 


 





 


Eosinophils # (Auto)


 0.2 x10^3/uL


(0.0-0.7) 


 





 


Basophils # (Auto)


 0.1 x10^3/uL


(0.0-0.2) 


 





 


D-Dimer (Kassi)


 < 0.27


ug/mlFEU 


 





 


Sodium Level


 139 mmol/L


(136-145) 


 





 


Potassium Level


 3.6 mmol/L


(3.5-5.1) 


 





 


Chloride Level


 106 mmol/L


() 


 





 


Carbon Dioxide Level


 25 mmol/L


(21-32) 


 





 


Anion Gap 8 (6-14)   


 


Blood Urea Nitrogen


 12 mg/dL


(7-20) 


 





 


Creatinine


 0.8 mg/dL


(0.6-1.0) 


 





 


Estimated GFR


(Cockcroft-Gault) 89.7 


 


 





 


BUN/Creatinine Ratio 15 (6-20)   


 


Glucose Level


 98 mg/dL


(70-99) 


 





 


Calcium Level


 8.4 mg/dL


(8.5-10.1) 


 





 


Total Bilirubin


 0.2 mg/dL


(0.2-1.0) 


 





 


Aspartate Amino Transf


(AST/SGOT) 43 U/L (15-37) 


 


 





 


Alanine Aminotransferase


(ALT/SGPT) 37 U/L (14-59) 


 


 





 


Alkaline Phosphatase


 86 U/L


() 


 





 


Total Protein


 6.9 g/dL


(6.4-8.2) 


 





 


Albumin


 3.5 g/dL


(3.4-5.0) 


 





 


Albumin/Globulin Ratio 1.0 (1.0-1.7)   


 


Lipase


 184 U/L


() 


 





 


Urine Collection Type  Unknown  


 


Urine Color  Yellow  


 


Urine Clarity  Clear  


 


Urine pH  7.5 (<5.0-8.0)  


 


Urine Specific Gravity


 


 1.010


(1.000-1.030) 





 


Urine Protein


 


 Negative mg/dL


(NEG-TRACE) 





 


Urine Glucose (UA)


 


 Negative mg/dL


(NEG) 





 


Urine Ketones (Stick)


 


 Negative mg/dL


(NEG) 





 


Urine Blood  Negative (NEG)  


 


Urine Nitrite  Negative (NEG)  


 


Urine Bilirubin  Negative (NEG)  


 


Urine Urobilinogen Dipstick


 


 0.2 mg/dL (0.2


mg/dL) 





 


Urine Leukocyte Esterase  Negative (NEG)  


 


Urine RBC  0 /HPF (0-2)  


 


Urine WBC  1-4 /HPF (0-4)  


 


Urine Squamous Epithelial


Cells 


 Many /LPF 


 





 


Urine Bacteria  0 /HPF (0-FEW)  


 


Urine Pregnancy Test   Negative (NEG) 








Laboratory Tests








Test


 4/9/20


01:12 4/9/20


01:35 4/9/20


08:49


 


White Blood Count


 8.1 x10^3/uL


(4.0-11.0) 


 





 


Red Blood Count


 4.22 x10^6/uL


(3.50-5.40) 


 





 


Hemoglobin


 11.9 g/dL


(12.0-15.5) 


 





 


Hematocrit


 36.1 %


(36.0-47.0) 


 





 


Mean Corpuscular Volume 86 fL ()   


 


Mean Corpuscular Hemoglobin 28 pg (25-35)   


 


Mean Corpuscular Hemoglobin


Concent 33 g/dL


(31-37) 


 





 


Red Cell Distribution Width


 15.8 %


(11.5-14.5) 


 





 


Platelet Count


 158 x10^3/uL


(140-400) 


 





 


Neutrophils (%) (Auto) 55 % (31-73)   


 


Lymphocytes (%) (Auto) 34 % (24-48)   


 


Monocytes (%) (Auto) 8 % (0-9)   


 


Eosinophils (%) (Auto) 2 % (0-3)   


 


Basophils (%) (Auto) 1 % (0-3)   


 


Neutrophils # (Auto)


 4.5 x10^3/uL


(1.8-7.7) 


 





 


Lymphocytes # (Auto)


 2.8 x10^3/uL


(1.0-4.8) 


 





 


Monocytes # (Auto)


 0.6 x10^3/uL


(0.0-1.1) 


 





 


Eosinophils # (Auto)


 0.2 x10^3/uL


(0.0-0.7) 


 





 


Basophils # (Auto)


 0.1 x10^3/uL


(0.0-0.2) 


 





 


D-Dimer (Kassi)


 < 0.27


ug/mlFEU 


 





 


Sodium Level


 139 mmol/L


(136-145) 


 





 


Potassium Level


 3.6 mmol/L


(3.5-5.1) 


 





 


Chloride Level


 106 mmol/L


() 


 





 


Carbon Dioxide Level


 25 mmol/L


(21-32) 


 





 


Anion Gap 8 (6-14)   


 


Blood Urea Nitrogen


 12 mg/dL


(7-20) 


 





 


Creatinine


 0.8 mg/dL


(0.6-1.0) 


 





 


Estimated GFR


(Cockcroft-Gault) 89.7 


 


 





 


BUN/Creatinine Ratio 15 (6-20)   


 


Glucose Level


 98 mg/dL


(70-99) 


 





 


Calcium Level


 8.4 mg/dL


(8.5-10.1) 


 





 


Total Bilirubin


 0.2 mg/dL


(0.2-1.0) 


 





 


Aspartate Amino Transf


(AST/SGOT) 43 U/L (15-37) 


 


 





 


Alanine Aminotransferase


(ALT/SGPT) 37 U/L (14-59) 


 


 





 


Alkaline Phosphatase


 86 U/L


() 


 





 


Total Protein


 6.9 g/dL


(6.4-8.2) 


 





 


Albumin


 3.5 g/dL


(3.4-5.0) 


 





 


Albumin/Globulin Ratio 1.0 (1.0-1.7)   


 


Lipase


 184 U/L


() 


 





 


Urine Collection Type  Unknown  


 


Urine Color  Yellow  


 


Urine Clarity  Clear  


 


Urine pH  7.5 (<5.0-8.0)  


 


Urine Specific Gravity


 


 1.010


(1.000-1.030) 





 


Urine Protein


 


 Negative mg/dL


(NEG-TRACE) 





 


Urine Glucose (UA)


 


 Negative mg/dL


(NEG) 





 


Urine Ketones (Stick)


 


 Negative mg/dL


(NEG) 





 


Urine Blood  Negative (NEG)  


 


Urine Nitrite  Negative (NEG)  


 


Urine Bilirubin  Negative (NEG)  


 


Urine Urobilinogen Dipstick


 


 0.2 mg/dL (0.2


mg/dL) 





 


Urine Leukocyte Esterase  Negative (NEG)  


 


Urine RBC  0 /HPF (0-2)  


 


Urine WBC  1-4 /HPF (0-4)  


 


Urine Squamous Epithelial


Cells 


 Many /LPF 


 





 


Urine Bacteria  0 /HPF (0-FEW)  


 


Urine Pregnancy Test   Negative (NEG) 











Images


Images





EXAM: RIGHT UPPER QUADRANT ULTRASOUND.


 


HISTORY: Right upper quadrant pain.


 


COMPARISON: None.


 


FINDINGS: Sonographic evaluation of the right upper quadrant was 


performed.


 


Hyperechogenicity of the hepatic parenchyma is consistent with diffuse 


hepatic steatosis. There are no focal lesions. 


 


Gallstones are noted in the gallbladder neck. The gallbladder is 


distended. Its wall is thickened at 4 mm. The sonographer notes trace 


pericholecystic fluid on real-time scanning.  There is no sonographic 


Potts sign. The common duct measures 7 mm. The visualized portions of the


head and body of the pancreas reveal no abnormality.


 


The right kidney measures 11.0 cm. Cortical thickness and echogenicity are


preserved. There is no hydronephrosis.


 


The visualized portions of the abdominal aorta and inferior vena cava are 


grossly patent and normal in caliber.


 


IMPRESSION:


1. Cholelithiasis with gallbladder wall thickening and pericholecystic 


fluid. Correlate for evidence of infection to assess for acute 


cholecystitis.


2. The common duct is at the upper limits of normal caliber. No clear 


common duct stones are seen by this technique.


3. Diffuse hepatic steatosis. 


 


Electronically signed by: AALIYAH Raines MD (4/9/2020 3:43 AM) [facility]














DICTATED and SIGNED BY:     EUSEBIO RAINES MD


DATE:     04/09/20 0343





VTE Prophylaxis Ordered


VTE Prophylaxis Devices:  No


VTE Pharmacological Prophylaxi:  Yes





Assessment/Plan


Assessment/Plan


impression








cholecystitis, acute , Cholelithiasis with gallbladder wall thickening and 

pericholecystic  fluid. common duct is at the upper limits of normal caliber. No

 clear common duct stones are seen 


hepatic steatosis. 











plan 








admit


npo


consult gen surgery


laproscopic  cholecystectomy


iv fluid support











RITA JOHNSON MD           Apr 9, 2020 11:36

## 2020-04-09 NOTE — OP
DATE OF SURGERY:  04/09/2020



PREOPERATIVE DIAGNOSIS:  Cholelithiasis with acute cholecystitis.



POSTOPERATIVE DIAGNOSIS:  Cholelithiasis with acute cholecystitis.



PROCEDURE:  Laparoscopic cholecystectomy with cholangiogram.



SURGEON:  Gregorio Suh MD



ASSISTANT:  KIRSTIN Shaffer



ANESTHESIA:  General endotracheal.



ESTIMATED BLOOD LOSS:  10 mL.



INTRAVENOUS FLUIDS:  600 mL.



INDICATIONS:  The patient is a 24-year-old mother of 2 with right upper quadrant

pain.  Ultrasound shows stones and cholecystitis.  She is brought for

cholecystectomy.



OPERATIVE FINDINGS:  The liver was somewhat generous, gallbladder was distended

and edematous, but supple.  Visual inspection of the remainder of the abdomen

showed only some serosanguineous fluid in the pelvis.



DESCRIPTION OF PROCEDURE:  The patient brought to the operating suite, given a

general endotracheal anesthetic and the abdomen prepped and draped in usual

sterile fashion.  A supraumbilical incision was infiltrated with local

anesthetic, incised and a 5 mm Visiport used to safely gain access into the

abdominal cavity.  Pneumoperitoneum established.  Camera inserted.  Inspection

carried out with results as noted above.  With the table in reverse

Trendelenburg rolled to the left, the epigastric, midclavicular, and lateral

ports were placed.  The gallbladder was aspirated 60 mL of bile to allow

grasping of the fundus and retracting it superolaterally.  The cystic duct and

cystic artery were identified.  The duct was clipped on the gallbladder side. 

Cholangiograms were made.  Initial distal common duct abnormality resolved after

1 mg of glucagon was given.  In light of this, the catheter was removed.  The

cystic duct was clipped x 3 and divided, taking care to avoid injury or

compromise of the common duct.



Cystic artery was clipped and divided and gallbladder freed from the bed with

cautery dissection and placed in an EndoCatch bag.  Hemostasis in the fossa with

cautery and a small piece of Surgicel.  No evidence of bile leak seen.  A

19-Polish round Devin drain brought through the epigastric port out the lateral

port, sewn to the skin with a silk stitch and left in the subhepatic space for

postoperative drainage.  Table returned to level.  Gallbladder delivered through

the epigastric incision, which was then closed with 0 Vicryl suture.  At 6 cm of

water pressure intraabdominally, no bleeding from the epigastric closure or the

midclavicular port site after its removal or the drain site.  Abdomen

decompressed, camera removed.  Skin incisions closed with subcuticular 4-0

Monocryl.  Steri-Strips and sterile dressings applied.  The patient awakened

from her anesthetic and taken to the recovery room in satisfactory condition.

 



______________________________

GREGORIO SUH MD



DR:  ESMER/jono  JOB#:  [id number] / [id number]

DD:  04/09/2020 14:36  DT:  04/09/2020 14:48

## 2020-04-09 NOTE — PDOC
BRIEF OPERATIVE NOTE


Date:  Apr 9, 2020


Pre-Op Diagnosis


cholelithiasis with acute cholecystitis


Post-Op Diagnosis


same


Procedure Performed


l/s maicol with grams


Surgeon


Andrea


Assistant


Daniel FULTON


Anesthesia Type:  General


Blood Loss


10cc


IV Fluid


600cc


Specimens Obtained


GB


Findings


supple, edematous GB


Complications


none











RAJ SUH MD                 Apr 9, 2020 12:15

## 2020-04-09 NOTE — RAD
EXAM: RIGHT UPPER QUADRANT ULTRASOUND.

 

HISTORY: Right upper quadrant pain.

 

COMPARISON: None.

 

FINDINGS: Sonographic evaluation of the right upper quadrant was 

performed.

 

Hyperechogenicity of the hepatic parenchyma is consistent with diffuse 

hepatic steatosis. There are no focal lesions. 

 

Gallstones are noted in the gallbladder neck. The gallbladder is 

distended. Its wall is thickened at 4 mm. The sonographer notes trace 

pericholecystic fluid on real-time scanning.  There is no sonographic 

Potts sign. The common duct measures 7 mm. The visualized portions of the

head and body of the pancreas reveal no abnormality.

 

The right kidney measures 11.0 cm. Cortical thickness and echogenicity are

preserved. There is no hydronephrosis.

 

The visualized portions of the abdominal aorta and inferior vena cava are 

grossly patent and normal in caliber.

 

IMPRESSION:

1. Cholelithiasis with gallbladder wall thickening and pericholecystic 

fluid. Correlate for evidence of infection to assess for acute 

cholecystitis.

2. The common duct is at the upper limits of normal caliber. No clear 

common duct stones are seen by this technique.

3. Diffuse hepatic steatosis. 

 

Electronically signed by: AALIYAH Raines MD (4/9/2020 3:43 AM) [facility]

## 2020-04-10 VITALS — DIASTOLIC BLOOD PRESSURE: 49 MMHG | SYSTOLIC BLOOD PRESSURE: 103 MMHG

## 2020-04-10 VITALS — SYSTOLIC BLOOD PRESSURE: 103 MMHG | DIASTOLIC BLOOD PRESSURE: 45 MMHG

## 2020-04-10 VITALS — DIASTOLIC BLOOD PRESSURE: 56 MMHG | SYSTOLIC BLOOD PRESSURE: 113 MMHG

## 2020-04-10 VITALS — DIASTOLIC BLOOD PRESSURE: 65 MMHG | SYSTOLIC BLOOD PRESSURE: 112 MMHG

## 2020-04-10 VITALS — SYSTOLIC BLOOD PRESSURE: 97 MMHG | DIASTOLIC BLOOD PRESSURE: 42 MMHG

## 2020-04-10 VITALS — SYSTOLIC BLOOD PRESSURE: 106 MMHG | DIASTOLIC BLOOD PRESSURE: 42 MMHG

## 2020-04-10 LAB
ALBUMIN SERPL-MCNC: 3.1 G/DL (ref 3.4–5)
ALBUMIN/GLOB SERPL: 1 {RATIO} (ref 1–1.7)
ALP SERPL-CCNC: 85 U/L (ref 46–116)
ALT SERPL-CCNC: 264 U/L (ref 14–59)
ANION GAP SERPL CALC-SCNC: 9 MMOL/L (ref 6–14)
AST SERPL-CCNC: 307 U/L (ref 15–37)
BASOPHILS # BLD AUTO: 0 X10^3/UL (ref 0–0.2)
BASOPHILS NFR BLD: 0 % (ref 0–3)
BILIRUB SERPL-MCNC: 1.5 MG/DL (ref 0.2–1)
BUN SERPL-MCNC: 7 MG/DL (ref 7–20)
BUN/CREAT SERPL: 12 (ref 6–20)
CALCIUM SERPL-MCNC: 8.3 MG/DL (ref 8.5–10.1)
CHLORIDE SERPL-SCNC: 104 MMOL/L (ref 98–107)
CO2 SERPL-SCNC: 24 MMOL/L (ref 21–32)
CREAT SERPL-MCNC: 0.6 MG/DL (ref 0.6–1)
EOSINOPHIL NFR BLD: 0 % (ref 0–3)
EOSINOPHIL NFR BLD: 0 X10^3/UL (ref 0–0.7)
ERYTHROCYTE [DISTWIDTH] IN BLOOD BY AUTOMATED COUNT: 15.8 % (ref 11.5–14.5)
GFR SERPLBLD BASED ON 1.73 SQ M-ARVRAT: 125 ML/MIN
GLOBULIN SER-MCNC: 3.2 G/DL (ref 2.2–3.8)
GLUCOSE SERPL-MCNC: 101 MG/DL (ref 70–99)
HCT VFR BLD CALC: 34.7 % (ref 36–47)
HGB BLD-MCNC: 11.4 G/DL (ref 12–15.5)
LYMPHOCYTES # BLD: 1.1 X10^3/UL (ref 1–4.8)
LYMPHOCYTES NFR BLD AUTO: 13 % (ref 24–48)
MCH RBC QN AUTO: 28 PG (ref 25–35)
MCHC RBC AUTO-ENTMCNC: 33 G/DL (ref 31–37)
MCV RBC AUTO: 87 FL (ref 79–100)
MONO #: 0.7 X10^3/UL (ref 0–1.1)
MONOCYTES NFR BLD: 8 % (ref 0–9)
NEUT #: 6.8 X10^3/UL (ref 1.8–7.7)
NEUTROPHILS NFR BLD AUTO: 78 % (ref 31–73)
PLATELET # BLD AUTO: 168 X10^3/UL (ref 140–400)
POTASSIUM SERPL-SCNC: 3.9 MMOL/L (ref 3.5–5.1)
PROT SERPL-MCNC: 6.3 G/DL (ref 6.4–8.2)
RBC # BLD AUTO: 4.01 X10^6/UL (ref 3.5–5.4)
SODIUM SERPL-SCNC: 137 MMOL/L (ref 136–145)
WBC # BLD AUTO: 8.6 X10^3/UL (ref 4–11)

## 2020-04-10 RX ADMIN — PIPERACILLIN SODIUM AND TAZOBACTAM SODIUM SCH MLS/HR: 3; .375 INJECTION, POWDER, LYOPHILIZED, FOR SOLUTION INTRAVENOUS at 05:43

## 2020-04-10 RX ADMIN — Medication SCH CAP: at 20:00

## 2020-04-10 RX ADMIN — HYDROMORPHONE HYDROCHLORIDE PRN MG: 2 INJECTION INTRAMUSCULAR; INTRAVENOUS; SUBCUTANEOUS at 20:00

## 2020-04-10 RX ADMIN — HYDROMORPHONE HYDROCHLORIDE PRN MG: 2 INJECTION INTRAMUSCULAR; INTRAVENOUS; SUBCUTANEOUS at 16:15

## 2020-04-10 RX ADMIN — DOCUSATE SODIUM SCH MG: 100 CAPSULE, LIQUID FILLED ORAL at 08:36

## 2020-04-10 RX ADMIN — DOCUSATE SODIUM SCH MG: 100 CAPSULE, LIQUID FILLED ORAL at 20:01

## 2020-04-10 RX ADMIN — SODIUM CHLORIDE AND POTASSIUM CHLORIDE SCH MLS/HR: 4.5; 1.49 INJECTION, SOLUTION INTRAVENOUS at 16:00

## 2020-04-10 RX ADMIN — ONDANSETRON PRN MG: 2 INJECTION INTRAMUSCULAR; INTRAVENOUS at 10:51

## 2020-04-10 RX ADMIN — PIPERACILLIN SODIUM AND TAZOBACTAM SODIUM SCH MLS/HR: 3; .375 INJECTION, POWDER, LYOPHILIZED, FOR SOLUTION INTRAVENOUS at 13:30

## 2020-04-10 RX ADMIN — BACITRACIN SCH MLS/HR: 5000 INJECTION, POWDER, FOR SOLUTION INTRAMUSCULAR at 08:37

## 2020-04-10 RX ADMIN — HYDROMORPHONE HYDROCHLORIDE PRN MG: 2 INJECTION INTRAMUSCULAR; INTRAVENOUS; SUBCUTANEOUS at 10:51

## 2020-04-10 RX ADMIN — OXYCODONE HYDROCHLORIDE AND ACETAMINOPHEN PRN TAB: 5; 325 TABLET ORAL at 05:43

## 2020-04-10 RX ADMIN — ENOXAPARIN SODIUM SCH MG: 40 INJECTION SUBCUTANEOUS at 20:01

## 2020-04-10 RX ADMIN — HYDROMORPHONE HYDROCHLORIDE PRN MG: 2 INJECTION INTRAMUSCULAR; INTRAVENOUS; SUBCUTANEOUS at 08:36

## 2020-04-10 RX ADMIN — PIPERACILLIN SODIUM AND TAZOBACTAM SODIUM SCH MLS/HR: 3; .375 INJECTION, POWDER, LYOPHILIZED, FOR SOLUTION INTRAVENOUS at 19:13

## 2020-04-10 NOTE — PDOC
PROGRESS NOTES


History of Present Illness


History of Present Illness





DICTATED and SIGNED BY:     EUSEBIO BROWN MD


DATE:     04/09/20 0343





VTE Prophylaxis Ordered


VTE Prophylaxis Devices:  No


VTE Pharmacological Prophylaxi:  Yes





Assessment/Plan


Assessment/Plan


impression








cholecystitis, acute , Cholelithiasis with gallbladder wall thickening and 

pericholecystic  fluid. common duct is at the upper limits of normal caliber. No

clear common duct stones are seen 


hepatic steatosis. 





pod # 2 pain and nausea not well controlled











plan 








admit





consult gen surgery


laproscopic  cholecystectomy


iv fluid support


inc iv dilaudid to 1mg q 4 hrs prn


ADAT





Vitals


Vitals





Vital Signs








  Date Time  Temp Pulse Resp B/P (MAP) Pulse Ox O2 Delivery O2 Flow Rate FiO2


 


4/10/20 09:51      Room Air  


 


4/10/20 07:40 98.1 77 14 97/42 (60) 95   





 98.1       


 


4/9/20 12:49       10.0 











Physical Exam


General:  Alert, Oriented X3, Cooperative, No acute distress


Heart:  Regular rate, Normal S1, Normal S2


Lungs:  Clear


Abdomen:  Normal bowel sounds, Soft, Other (Mild incisional tenderness wounds 

clean dry and intact JAMILAH drain intact with serosanguineous output)


Extremities:  No cyanosis


Skin:  No rashes, No breakdown





Labs


LABS





Laboratory Tests








Test


 4/10/20


03:44


 


White Blood Count


 8.6 x10^3/uL


(4.0-11.0)


 


Red Blood Count


 4.01 x10^6/uL


(3.50-5.40)


 


Hemoglobin


 11.4 g/dL


(12.0-15.5)


 


Hematocrit


 34.7 %


(36.0-47.0)


 


Mean Corpuscular Volume 87 fL () 


 


Mean Corpuscular Hemoglobin 28 pg (25-35) 


 


Mean Corpuscular Hemoglobin


Concent 33 g/dL


(31-37)


 


Red Cell Distribution Width


 15.8 %


(11.5-14.5)


 


Platelet Count


 168 x10^3/uL


(140-400)


 


Neutrophils (%) (Auto) 78 % (31-73) 


 


Lymphocytes (%) (Auto) 13 % (24-48) 


 


Monocytes (%) (Auto) 8 % (0-9) 


 


Eosinophils (%) (Auto) 0 % (0-3) 


 


Basophils (%) (Auto) 0 % (0-3) 


 


Neutrophils # (Auto)


 6.8 x10^3/uL


(1.8-7.7)


 


Lymphocytes # (Auto)


 1.1 x10^3/uL


(1.0-4.8)


 


Monocytes # (Auto)


 0.7 x10^3/uL


(0.0-1.1)


 


Eosinophils # (Auto)


 0.0 x10^3/uL


(0.0-0.7)


 


Basophils # (Auto)


 0.0 x10^3/uL


(0.0-0.2)


 


Sodium Level


 137 mmol/L


(136-145)


 


Potassium Level


 3.9 mmol/L


(3.5-5.1)


 


Chloride Level


 104 mmol/L


()


 


Carbon Dioxide Level


 24 mmol/L


(21-32)


 


Anion Gap 9 (6-14) 


 


Blood Urea Nitrogen 7 mg/dL (7-20) 


 


Creatinine


 0.6 mg/dL


(0.6-1.0)


 


Estimated GFR


(Cockcroft-Gault) 125.0 





 


BUN/Creatinine Ratio 12 (6-20) 


 


Glucose Level


 101 mg/dL


(70-99)


 


Calcium Level


 8.3 mg/dL


(8.5-10.1)


 


Total Bilirubin


 1.5 mg/dL


(0.2-1.0)


 


Aspartate Amino Transf


(AST/SGOT) 307 U/L


(15-37)


 


Alanine Aminotransferase


(ALT/SGPT) 264 U/L


(14-59)


 


Alkaline Phosphatase


 85 U/L


()


 


Total Protein


 6.3 g/dL


(6.4-8.2)


 


Albumin


 3.1 g/dL


(3.4-5.0)


 


Albumin/Globulin Ratio 1.0 (1.0-1.7) 











Comment


Review of Relevant


I have reviewed the following items chelsea (where applicable) has been applied.


Labs





Laboratory Tests








Test


 4/9/20


01:12 4/9/20


01:35 4/9/20


08:49 4/10/20


03:44


 


White Blood Count


 8.1 x10^3/uL


(4.0-11.0) 


 


 8.6 x10^3/uL


(4.0-11.0)


 


Red Blood Count


 4.22 x10^6/uL


(3.50-5.40) 


 


 4.01 x10^6/uL


(3.50-5.40)


 


Hemoglobin


 11.9 g/dL


(12.0-15.5) 


 


 11.4 g/dL


(12.0-15.5)


 


Hematocrit


 36.1 %


(36.0-47.0) 


 


 34.7 %


(36.0-47.0)


 


Mean Corpuscular Volume 86 fL ()    87 fL () 


 


Mean Corpuscular Hemoglobin 28 pg (25-35)    28 pg (25-35) 


 


Mean Corpuscular Hemoglobin


Concent 33 g/dL


(31-37) 


 


 33 g/dL


(31-37)


 


Red Cell Distribution Width


 15.8 %


(11.5-14.5) 


 


 15.8 %


(11.5-14.5)


 


Platelet Count


 158 x10^3/uL


(140-400) 


 


 168 x10^3/uL


(140-400)


 


Neutrophils (%) (Auto) 55 % (31-73)    78 % (31-73) 


 


Lymphocytes (%) (Auto) 34 % (24-48)    13 % (24-48) 


 


Monocytes (%) (Auto) 8 % (0-9)    8 % (0-9) 


 


Eosinophils (%) (Auto) 2 % (0-3)    0 % (0-3) 


 


Basophils (%) (Auto) 1 % (0-3)    0 % (0-3) 


 


Neutrophils # (Auto)


 4.5 x10^3/uL


(1.8-7.7) 


 


 6.8 x10^3/uL


(1.8-7.7)


 


Lymphocytes # (Auto)


 2.8 x10^3/uL


(1.0-4.8) 


 


 1.1 x10^3/uL


(1.0-4.8)


 


Monocytes # (Auto)


 0.6 x10^3/uL


(0.0-1.1) 


 


 0.7 x10^3/uL


(0.0-1.1)


 


Eosinophils # (Auto)


 0.2 x10^3/uL


(0.0-0.7) 


 


 0.0 x10^3/uL


(0.0-0.7)


 


Basophils # (Auto)


 0.1 x10^3/uL


(0.0-0.2) 


 


 0.0 x10^3/uL


(0.0-0.2)


 


D-Dimer (Kassi)


 < 0.27


ug/mlFEU 


 


 





 


Sodium Level


 139 mmol/L


(136-145) 


 


 137 mmol/L


(136-145)


 


Potassium Level


 3.6 mmol/L


(3.5-5.1) 


 


 3.9 mmol/L


(3.5-5.1)


 


Chloride Level


 106 mmol/L


() 


 


 104 mmol/L


()


 


Carbon Dioxide Level


 25 mmol/L


(21-32) 


 


 24 mmol/L


(21-32)


 


Anion Gap 8 (6-14)    9 (6-14) 


 


Blood Urea Nitrogen


 12 mg/dL


(7-20) 


 


 7 mg/dL (7-20) 





 


Creatinine


 0.8 mg/dL


(0.6-1.0) 


 


 0.6 mg/dL


(0.6-1.0)


 


Estimated GFR


(Cockcroft-Gault) 89.7 


 


 


 125.0 





 


BUN/Creatinine Ratio 15 (6-20)    12 (6-20) 


 


Glucose Level


 98 mg/dL


(70-99) 


 


 101 mg/dL


(70-99)


 


Calcium Level


 8.4 mg/dL


(8.5-10.1) 


 


 8.3 mg/dL


(8.5-10.1)


 


Total Bilirubin


 0.2 mg/dL


(0.2-1.0) 


 


 1.5 mg/dL


(0.2-1.0)


 


Aspartate Amino Transf


(AST/SGOT) 43 U/L (15-37) 


 


 


 307 U/L


(15-37)


 


Alanine Aminotransferase


(ALT/SGPT) 37 U/L (14-59) 


 


 


 264 U/L


(14-59)


 


Alkaline Phosphatase


 86 U/L


() 


 


 85 U/L


()


 


Total Protein


 6.9 g/dL


(6.4-8.2) 


 


 6.3 g/dL


(6.4-8.2)


 


Albumin


 3.5 g/dL


(3.4-5.0) 


 


 3.1 g/dL


(3.4-5.0)


 


Albumin/Globulin Ratio 1.0 (1.0-1.7)    1.0 (1.0-1.7) 


 


Lipase


 184 U/L


() 


 


 





 


Urine Collection Type  Unknown   


 


Urine Color  Yellow   


 


Urine Clarity  Clear   


 


Urine pH  7.5 (<5.0-8.0)   


 


Urine Specific Gravity


 


 1.010


(1.000-1.030) 


 





 


Urine Protein


 


 Negative mg/dL


(NEG-TRACE) 


 





 


Urine Glucose (UA)


 


 Negative mg/dL


(NEG) 


 





 


Urine Ketones (Stick)


 


 Negative mg/dL


(NEG) 


 





 


Urine Blood  Negative (NEG)   


 


Urine Nitrite  Negative (NEG)   


 


Urine Bilirubin  Negative (NEG)   


 


Urine Urobilinogen Dipstick


 


 0.2 mg/dL (0.2


mg/dL) 


 





 


Urine Leukocyte Esterase  Negative (NEG)   


 


Urine RBC  0 /HPF (0-2)   


 


Urine WBC  1-4 /HPF (0-4)   


 


Urine Squamous Epithelial


Cells 


 Many /LPF 


 


 





 


Urine Bacteria  0 /HPF (0-FEW)   


 


Urine Pregnancy Test   Negative (NEG)  








Laboratory Tests








Test


 4/10/20


03:44


 


White Blood Count


 8.6 x10^3/uL


(4.0-11.0)


 


Red Blood Count


 4.01 x10^6/uL


(3.50-5.40)


 


Hemoglobin


 11.4 g/dL


(12.0-15.5)


 


Hematocrit


 34.7 %


(36.0-47.0)


 


Mean Corpuscular Volume 87 fL () 


 


Mean Corpuscular Hemoglobin 28 pg (25-35) 


 


Mean Corpuscular Hemoglobin


Concent 33 g/dL


(31-37)


 


Red Cell Distribution Width


 15.8 %


(11.5-14.5)


 


Platelet Count


 168 x10^3/uL


(140-400)


 


Neutrophils (%) (Auto) 78 % (31-73) 


 


Lymphocytes (%) (Auto) 13 % (24-48) 


 


Monocytes (%) (Auto) 8 % (0-9) 


 


Eosinophils (%) (Auto) 0 % (0-3) 


 


Basophils (%) (Auto) 0 % (0-3) 


 


Neutrophils # (Auto)


 6.8 x10^3/uL


(1.8-7.7)


 


Lymphocytes # (Auto)


 1.1 x10^3/uL


(1.0-4.8)


 


Monocytes # (Auto)


 0.7 x10^3/uL


(0.0-1.1)


 


Eosinophils # (Auto)


 0.0 x10^3/uL


(0.0-0.7)


 


Basophils # (Auto)


 0.0 x10^3/uL


(0.0-0.2)


 


Sodium Level


 137 mmol/L


(136-145)


 


Potassium Level


 3.9 mmol/L


(3.5-5.1)


 


Chloride Level


 104 mmol/L


()


 


Carbon Dioxide Level


 24 mmol/L


(21-32)


 


Anion Gap 9 (6-14) 


 


Blood Urea Nitrogen 7 mg/dL (7-20) 


 


Creatinine


 0.6 mg/dL


(0.6-1.0)


 


Estimated GFR


(Cockcroft-Gault) 125.0 





 


BUN/Creatinine Ratio 12 (6-20) 


 


Glucose Level


 101 mg/dL


(70-99)


 


Calcium Level


 8.3 mg/dL


(8.5-10.1)


 


Total Bilirubin


 1.5 mg/dL


(0.2-1.0)


 


Aspartate Amino Transf


(AST/SGOT) 307 U/L


(15-37)


 


Alanine Aminotransferase


(ALT/SGPT) 264 U/L


(14-59)


 


Alkaline Phosphatase


 85 U/L


()


 


Total Protein


 6.3 g/dL


(6.4-8.2)


 


Albumin


 3.1 g/dL


(3.4-5.0)


 


Albumin/Globulin Ratio 1.0 (1.0-1.7) 








Medications





Current Medications


Famotidine (Pepcid) 20 mg 1X  ONCE PO  Last administered on 4/9/20at 01:21;  

Start 4/9/20 at 01:30;  Stop 4/9/20 at 01:31;  Status DC


Ondansetron HCl (Zofran Odt) 4 mg 1X  ONCE PO  Last administered on 4/9/20at 

01:21;  Start 4/9/20 at 01:30;  Stop 4/9/20 at 01:31;  Status DC


Multi-Ingredient Mouthwash/Gargle (Gi Cocktail) 20 ml 1X  ONCE SWSW  Last 

administered on 4/9/20at 01:21;  Start 4/9/20 at 01:30;  Stop 4/9/20 at 01:31;  

Status DC


Fentanyl Citrate (Fentanyl 2ml Vial) 75 mcg 1X  ONCE IVP  Last administered on 

4/9/20at 01:30;  Start 4/9/20 at 01:30;  Stop 4/9/20 at 01:34;  Status DC


Piperacillin Sod/ Tazobactam Sod (Zosyn Per Pharmacy) 1 each PRN DAILY  PRN MC 

SEE COMMENTS;  Start 4/9/20 at 03:00


Piperacillin Sod/ Tazobactam Sod 3.375 gm/Sodium Chloride 50 ml @  100 mls/hr 

Q6HRS IV  Last administered on 4/10/20at 05:43;  Start 4/9/20 at 03:00


Ondansetron HCl (Zofran) 4 mg PRN Q8HRS  PRN IV NAUSEA/VOMITING 1ST CHOICE Last 

administered on 4/9/20at 03:07;  Start 4/9/20 at 03:00;  Stop 4/9/20 at 12:18;  

Status DC


Morphine Sulfate (Morphine Sulfate) 4 mg PRN Q2HR  PRN IV SEVERE PAIN 7-10 Last 

administered on 4/9/20at 05:17;  Start 4/9/20 at 03:00;  Stop 4/10/20 at 02:59; 

Status DC


Sodium Chloride 1,000 ml @  125 mls/hr Q8H IV  Last administered on 4/9/20at 0

6:40;  Start 4/9/20 at 03:00;  Stop 4/9/20 at 13:50;  Status DC


Ondansetron HCl (Zofran) 4 mg PRN Q6HRS  PRN IV NAUSEA/VOMITING;  Start 4/9/20 

at 08:45;  Stop 4/9/20 at 20:00;  Status DC


Fentanyl Citrate (Fentanyl 2ml Vial) 25 mcg PRN Q5MIN  PRN IV MILD PAIN 1-3 Last

administered on 4/9/20at 12:29;  Start 4/9/20 at 08:45;  Stop 4/9/20 at 20:00;  

Status DC


Fentanyl Citrate (Fentanyl 2ml Vial) 50 mcg PRN Q5MIN  PRN IV MODERATE TO SEVERE

PAIN Last administered on 4/9/20at 12:49;  Start 4/9/20 at 08:45;  Stop 4/9/20 

at 20:00;  Status DC


Morphine Sulfate (Morphine Sulfate) 1 mg PRN Q10MIN  PRN IV SEVERE PAIN 7-10;  

Start 4/9/20 at 08:45;  Stop 4/9/20 at 20:00;  Status DC


Ringer's Solution 1,000 ml @  30 mls/hr Q24H IV ;  Start 4/9/20 at 08:43;  Stop 

4/9/20 at 13:50;  Status DC


Hydromorphone HCl (Dilaudid) 0.5 mg PRN Q10MIN  PRN IV SEV PAIN, Second choice; 

Start 4/9/20 at 08:45;  Stop 4/9/20 at 20:00;  Status DC


Prochlorperazine Edisylate (Compazine) 5 mg PACU PRN  PRN IV NAUSEA, MRX1 Last 

administered on 4/9/20at 10:19;  Start 4/9/20 at 08:45;  Stop 4/9/20 at 20:00;  

Status DC


Dexamethasone Sodium Phosphate (Decadron) 4 mg STK-MED ONCE .ROUTE ;  Start 

4/9/20 at 10:45;  Stop 4/9/20 at 10:46;  Status DC


Famotidine (Pepcid Vial) 20 mg STK-MED ONCE .ROUTE ;  Start 4/9/20 at 10:45;  

Stop 4/9/20 at 10:46;  Status DC


Ondansetron HCl (Zofran) 4 mg STK-MED ONCE .ROUTE ;  Start 4/9/20 at 10:45;  Sto

p 4/9/20 at 10:46;  Status DC


Fentanyl Citrate (Fentanyl 2ml Vial) 100 mcg STK-MED ONCE .ROUTE ;  Start 4/9/20

at 10:45;  Stop 4/9/20 at 10:46;  Status DC


Midazolam HCl (Versed) 2 mg STK-MED ONCE .ROUTE ;  Start 4/9/20 at 10:45;  Stop 

4/9/20 at 10:46;  Status DC


Bupivacaine HCl/ Epinephrine Bitart (Sensorcain-Epi 0.5%-1:006945 Mpf) 30 ml 

STK-MED ONCE .ROUTE  Last administered on 4/9/20at 11:15;  Start 4/9/20 at 

10:49;  Stop 4/9/20 at 10:49;  Status DC


Glucagon (Glucagen) 1 mg STK-MED ONCE .ROUTE  Last administered on 4/9/20at 

11:34;  Start 4/9/20 at 10:49;  Stop 4/9/20 at 10:49;  Status DC


Iohexol (Omnipaque 300 Mg/ml) 50 ml STK-MED ONCE .ROUTE  Last administered on 

4/9/20at 11:15;  Start 4/9/20 at 10:49;  Stop 4/9/20 at 10:49;  Status DC


Cellulose (Surgicel Hemostat 4x8) 1 each STK-MED ONCE .ROUTE  Last administered 

on 4/9/20at 11:49;  Start 4/9/20 at 10:49;  Stop 4/9/20 at 10:49;  Status DC


Glycopyrrolate (Robinul) 1 mg STK-MED ONCE .ROUTE ;  Start 4/9/20 at 11:37;  

Stop 4/9/20 at 11:37;  Status DC


Neostigmine Bromide (Neostigmine Methylsulfate) 5 mg STK-MED ONCE .ROUTE ;  

Start 4/9/20 at 11:37;  Stop 4/9/20 at 11:37;  Status DC


Sevoflurane (Ultane) 60 ml STK-MED ONCE IH ;  Start 4/9/20 at 11:58;  Stop 

4/9/20 at 11:58;  Status DC


Enoxaparin Sodium (Lovenox 40mg Syringe) 40 mg Q24H SQ  Last administered on 

4/9/20at 21:51;  Start 4/9/20 at 21:00


Sodium Chloride (Normal Saline Flush) 3 ml QSHIFT  PRN IV AFTER MEDS AND BLOOD 

DRAWS;  Start 4/9/20 at 12:15;  Stop 4/9/20 at 15:45;  Status DC


Potassium Chloride/Sodium Chloride 1,000 ml @  80 mls/hr [id number] IV  Last 

administered on 4/9/20at 23:30;  Start 4/9/20 at 12:08


Dextrose (Dextrose 50%-Water Syringe) 12.5 gm PRN Q15MIN  PRN IV SEE COMMENTS;  

Start 4/9/20 at 12:15


Oxycodone/ Acetaminophen (Percocet 5/325) 1 tab PRN Q4HRS  PRN PO MILD PAIN, 1ST

CHOICE Last administered on 4/10/20at 05:43;  Start 4/9/20 at 12:15


Naloxone HCl (Narcan) 0.4 mg PRN Q2MIN  PRN IV SEE INSTRUCTIONS;  Start 4/9/20 

at 12:15


Sodium Chloride 1,000 ml @  25 mls/hr Q24H IV ;  Start 4/9/20 at 12:08;  Stop 

4/9/20 at 13:50;  Status DC


Hydromorphone HCl (Dilaudid) 0.5 mg PRN Q3HRS  PRN IV PAIN Last administered on 

4/10/20at 08:36;  Start 4/9/20 at 12:15


Docusate Sodium (Colace) 100 mg BID PO  Last administered on 4/10/20at 08:36;  

Start 4/9/20 at 21:00


Ondansetron HCl (Zofran) 4 mg PRN Q6HRS  PRN IVP NAUESA, 1ST CHOICE;  Start 

4/9/20 at 12:15


Sodium Chloride (Normal Saline Flush) 3 ml QSHIFT  PRN IV AFTER MEDS AND BLOOD 

DRAWS;  Start 4/9/20 at 12:30


Sodium Chloride 1,000 ml @  100 mls/hr Q10H IV ;  Start 4/9/20 at 12:21


Ondansetron HCl (Zofran) 4 mg PRN Q4HRS  PRN IV NAUSEA/VOMITING Last 

administered on 4/9/20at 13:47;  Start 4/9/20 at 12:30


Acetaminophen (Tylenol) 650 mg PRN Q4HRS  PRN PO TEMP OVER 100.4F OR MILD PAIN; 

Start 4/9/20 at 12:30


Acetaminophen (Tylenol Supp) 650 mg PRN Q4HRS  PRN OK TEMP OVER 100.4F OR MILD 

PAIN;  Start 4/9/20 at 12:30


Clonidine HCl (Catapres) 0.1 mg PRN Q6HRS  PRN PO SBP>160 OR DBP>90;  Start 

4/9/20 at 12:30


Sodium Monofluorophosphate (Fleet Adult) 133 ml PRN DAILY  PRN OK CONSTIPATION; 

Start 4/9/20 at 12:30


Docusate Sodium (Colace) 100 mg PRN BID  PRN PO CONSTIPATION;  Start 4/9/20 at 

12:30


Albuterol Sulfate (Ventolin Neb Soln) 2.5 mg PRN Q4HRS  PRN NEB SHORTNESS OF 

BREATH;  Start 4/9/20 at 12:30


Guaifenesin (Robitussin) 200 mg PRN Q4HRS  PRN PO COUGH;  Start 4/9/20 at 12:30


Enoxaparin Sodium (Lovenox 40mg Syringe) 40 mg Q24H SQ ;  Start 4/9/20 at 13:00;

 Stop 4/9/20 at 13:50;  Status DC


Fentanyl Citrate (Fentanyl 2ml Vial) 100 mcg STK-MED ONCE .ROUTE ;  Start 4/9/20

at 12:46;  Stop 4/9/20 at 12:46;  Status DC


Enoxaparin Sodium (Lovenox 40mg Syringe) 40 mg Q24H SQ ;  Start 4/9/20 at 21:00;

 Status UNV


Prochlorperazine (Compazine) 25 mg PRN Q8HRS  PRN OK NAUSEA/VOMITING Last 

administered on 4/9/20at 16:11;  Start 4/9/20 at 15:45


Vitals/I & O





Vital Sign - Last 24 Hours








 4/9/20 4/9/20 4/9/20 4/9/20





 10:23 12:12 12:27 12:29


 


Temp   98.1 





   98.1 


 


Pulse   81 


 


Resp 20  15 15


 


B/P (MAP)   93/47 


 


Pulse Ox 100  100 100


 


O2 Delivery Room Air Room Air Room Air Room Air


 


O2 Flow Rate   10 10.0


 


    





    





 4/9/20 4/9/20 4/9/20 4/9/20





 12:29 12:42 12:49 12:57


 


Temp  98.1  98.2





  98.1  98.2


 


Pulse  87  89


 


Resp 15 15 15 15


 


B/P (MAP)  153/66  113/57


 


Pulse Ox 100 95 100 97


 


O2 Delivery Room Air Room Air Room Air Room Air





  Simple Mask  


 


O2 Flow Rate 10.0 10.0 10.0 


 


    





    





 4/9/20 4/9/20 4/9/20 4/9/20





 13:20 13:30 13:30 13:37


 


Temp 98.7   





 98.7   


 


Pulse 72 72  


 


Resp 18   


 


B/P (MAP) 111/49 (69) 111/49 (69)  


 


Pulse Ox 97 97  


 


O2 Delivery Room Air Room Air Room Air Room Air


 


    





    





 4/9/20 4/9/20 4/9/20 4/9/20





 13:41 13:41 13:42 13:42


 


O2 Delivery Room Air Room Air Room Air Room Air





 4/9/20 4/9/20 4/9/20 4/9/20





 13:45 14:00 14:15 14:21


 


Pulse 88 88 87 


 


B/P (MAP) 106/66 (79) 114/66 (82) 113/65 (81) 


 


Pulse Ox 94 93 95 


 


O2 Delivery Room Air Room Air Room Air Room Air





 4/9/20 4/9/20 4/9/20 4/9/20





 14:30 14:45 15:11 15:15


 


Pulse 86 82  83


 


B/P (MAP) 112/68 (83) 113/57 (75)  108/65 (79)


 


Pulse Ox 95 95 97 95


 


O2 Delivery Room Air Room Air Room Air Room Air





 4/9/20 4/9/20 4/9/20 4/9/20





 16:15 17:15 19:00 19:29


 


Temp   98.6 





   98.6 


 


Pulse 83 83 85 


 


Resp   18 


 


B/P (MAP) 116/60 (78) 109/65 (80) 119/62 (81) 


 


Pulse Ox 95 95 95 


 


O2 Delivery Room Air Room Air Room Air Room Air


 


    





    





 4/9/20 4/9/20 4/9/20 4/9/20





 19:59 20:00 21:57 22:54


 


O2 Delivery Room Air Room Air Room Air Room Air





 4/9/20 4/9/20 4/9/20 4/10/20





 22:57 23:00 23:24 03:00


 


Temp  98.7  98.4





  98.7  98.4


 


Pulse  87  89


 


Resp  18  18


 


B/P (MAP)  110/61 (77)  103/49 (67)


 


Pulse Ox  94  94


 


O2 Delivery Room Air Room Air Room Air Room Air


 


    





    





 4/10/20 4/10/20 4/10/20 4/10/20





 05:43 06:43 07:40 08:05


 


Temp   98.1 





   98.1 


 


Pulse   77 


 


Resp   14 


 


B/P (MAP)   97/42 (60) 


 


Pulse Ox   95 


 


O2 Delivery Room Air Room Air Room Air Room Air


 


    





    





 4/10/20 4/10/20  





 08:36 09:51  


 


O2 Delivery Room Air Room Air  














Intake and Output   


 


 4/9/20 4/9/20 4/10/20





 15:00 23:00 07:00


 


Intake Total 1300 ml 0 ml 


 


Output Total 10 ml  30 ml


 


Balance 1290 ml 0 ml -30 ml

















RITA JOHNSON MD          Apr 10, 2020 10:18

## 2020-04-10 NOTE — PATHOLOGY
[facility] Accession Number: [id number]

.                                                                01

Material submitted:                                        .

gallbladder - GALLBLADDER AND CONTENTS

.                                                                01

Clinical history:                                          .

Cholecystitis

.                                                                02

**********************************************************************

Diagnosis:

"Gallbladder and contents", cholecystectomy:

- Chronic cholecystitis.

- Cholelithiasis.

(CLW/db; 4/10/2020)

LBQ  04/10/2020  1203 Local

**********************************************************************

.                                                                02

Electronically signed:                                     .

Rosario Dickson MD, Pathologist

NPI- [id number]

.                                                                01

Gross description:                                         .

The specimen is received in formalin, labeled "Loreta Richard,

gallbladder and contents".  Received is an intact gallbladder measuring

8.0 x 4.3 x 3.4 cm in greatest dimensions displaying a pink-gray serosal

surface.  Opening the specimen reveals a velvety, pink-tan mucosa with a

gallbladder wall thickness of 0.1 cm.  Calculi are present displaying a

yellow-tan and nodular appearance, and no masses or lesions are noted

grossly.  Representative sections, to include the proximal margin, are

submitted in cassette A1.

(CAA; 4/9/2020)

QAC/QAC  04/09/2020  1512 Local

.                                                                02

Pathologist provided ICD-10:

K80.10

.                                                                02

CPT                                                        .

[id number]

Specimen Comment: A courtesy copy of this report has been sent to [phone], [phone]

Specimen Comment: [id number]

Specimen Comment: Report sent to  / DR JIMENEZ

***Performed at:  01

   [facility]

   [address] [facility] [address], [place]  [id number]

   MD Med Coello MD Phone:  [phone]

***Performed at:  02

   [facility]

   [address]  [id number]

   MD Spencer Kerley MD Phone:  [phone]

## 2020-04-10 NOTE — PDOC
SURGICAL PROGRESS NOTE


Subjective


Patient doing fairly well minimal abdominal pain some nausea able to tolerate 

diet


Vital Signs





Vital Signs








  Date Time  Temp Pulse Resp B/P (MAP) Pulse Ox O2 Delivery O2 Flow Rate FiO2


 


4/10/20 08:36      Room Air  


 


4/10/20 07:40 98.1 77 14 97/42 (60) 95   





 98.1       


 


4/9/20 12:49       10.0 








I&O











Intake and Output 


 


 4/10/20





 07:00


 


Intake Total 1300 ml


 


Output Total 40 ml


 


Balance 1260 ml


 


 


 


Intake Oral 0 ml


 


IV Total 1300 ml


 


Output Drainage Total 30 ml


 


Estimated Blood Loss 10 ml


 


# Voids 2








PATIENT HAS A NARVAEZ:  No


General:  Alert, Oriented X3, Cooperative, mild distress


Abdomen:  Normal bowel sounds, Soft, Other (Mild incisional tenderness wounds 

clean dry and intact JAMILAH drain intact with serosanguineous output)


Labs





Laboratory Tests








Test


 4/9/20


01:12 4/9/20


01:35 4/9/20


08:49 4/10/20


03:44


 


White Blood Count


 8.1 x10^3/uL


(4.0-11.0) 


 


 8.6 x10^3/uL


(4.0-11.0)


 


Red Blood Count


 4.22 x10^6/uL


(3.50-5.40) 


 


 4.01 x10^6/uL


(3.50-5.40)


 


Hemoglobin


 11.9 g/dL


(12.0-15.5) 


 


 11.4 g/dL


(12.0-15.5)


 


Hematocrit


 36.1 %


(36.0-47.0) 


 


 34.7 %


(36.0-47.0)


 


Mean Corpuscular Volume 86 fL ()    87 fL () 


 


Mean Corpuscular Hemoglobin 28 pg (25-35)    28 pg (25-35) 


 


Mean Corpuscular Hemoglobin


Concent 33 g/dL


(31-37) 


 


 33 g/dL


(31-37)


 


Red Cell Distribution Width


 15.8 %


(11.5-14.5) 


 


 15.8 %


(11.5-14.5)


 


Platelet Count


 158 x10^3/uL


(140-400) 


 


 168 x10^3/uL


(140-400)


 


Neutrophils (%) (Auto) 55 % (31-73)    78 % (31-73) 


 


Lymphocytes (%) (Auto) 34 % (24-48)    13 % (24-48) 


 


Monocytes (%) (Auto) 8 % (0-9)    8 % (0-9) 


 


Eosinophils (%) (Auto) 2 % (0-3)    0 % (0-3) 


 


Basophils (%) (Auto) 1 % (0-3)    0 % (0-3) 


 


Neutrophils # (Auto)


 4.5 x10^3/uL


(1.8-7.7) 


 


 6.8 x10^3/uL


(1.8-7.7)


 


Lymphocytes # (Auto)


 2.8 x10^3/uL


(1.0-4.8) 


 


 1.1 x10^3/uL


(1.0-4.8)


 


Monocytes # (Auto)


 0.6 x10^3/uL


(0.0-1.1) 


 


 0.7 x10^3/uL


(0.0-1.1)


 


Eosinophils # (Auto)


 0.2 x10^3/uL


(0.0-0.7) 


 


 0.0 x10^3/uL


(0.0-0.7)


 


Basophils # (Auto)


 0.1 x10^3/uL


(0.0-0.2) 


 


 0.0 x10^3/uL


(0.0-0.2)


 


D-Dimer (Kassi)


 < 0.27


ug/mlFEU 


 


 





 


Sodium Level


 139 mmol/L


(136-145) 


 


 137 mmol/L


(136-145)


 


Potassium Level


 3.6 mmol/L


(3.5-5.1) 


 


 3.9 mmol/L


(3.5-5.1)


 


Chloride Level


 106 mmol/L


() 


 


 104 mmol/L


()


 


Carbon Dioxide Level


 25 mmol/L


(21-32) 


 


 24 mmol/L


(21-32)


 


Anion Gap 8 (6-14)    9 (6-14) 


 


Blood Urea Nitrogen


 12 mg/dL


(7-20) 


 


 7 mg/dL (7-20) 





 


Creatinine


 0.8 mg/dL


(0.6-1.0) 


 


 0.6 mg/dL


(0.6-1.0)


 


Estimated GFR


(Cockcroft-Gault) 89.7 


 


 


 125.0 





 


BUN/Creatinine Ratio 15 (6-20)    12 (6-20) 


 


Glucose Level


 98 mg/dL


(70-99) 


 


 101 mg/dL


(70-99)


 


Calcium Level


 8.4 mg/dL


(8.5-10.1) 


 


 8.3 mg/dL


(8.5-10.1)


 


Total Bilirubin


 0.2 mg/dL


(0.2-1.0) 


 


 1.5 mg/dL


(0.2-1.0)


 


Aspartate Amino Transf


(AST/SGOT) 43 U/L (15-37) 


 


 


 307 U/L


(15-37)


 


Alanine Aminotransferase


(ALT/SGPT) 37 U/L (14-59) 


 


 


 264 U/L


(14-59)


 


Alkaline Phosphatase


 86 U/L


() 


 


 85 U/L


()


 


Total Protein


 6.9 g/dL


(6.4-8.2) 


 


 6.3 g/dL


(6.4-8.2)


 


Albumin


 3.5 g/dL


(3.4-5.0) 


 


 3.1 g/dL


(3.4-5.0)


 


Albumin/Globulin Ratio 1.0 (1.0-1.7)    1.0 (1.0-1.7) 


 


Lipase


 184 U/L


() 


 


 





 


Urine Collection Type  Unknown   


 


Urine Color  Yellow   


 


Urine Clarity  Clear   


 


Urine pH  7.5 (<5.0-8.0)   


 


Urine Specific Gravity


 


 1.010


(1.000-1.030) 


 





 


Urine Protein


 


 Negative mg/dL


(NEG-TRACE) 


 





 


Urine Glucose (UA)


 


 Negative mg/dL


(NEG) 


 





 


Urine Ketones (Stick)


 


 Negative mg/dL


(NEG) 


 





 


Urine Blood  Negative (NEG)   


 


Urine Nitrite  Negative (NEG)   


 


Urine Bilirubin  Negative (NEG)   


 


Urine Urobilinogen Dipstick


 


 0.2 mg/dL (0.2


mg/dL) 


 





 


Urine Leukocyte Esterase  Negative (NEG)   


 


Urine RBC  0 /HPF (0-2)   


 


Urine WBC  1-4 /HPF (0-4)   


 


Urine Squamous Epithelial


Cells 


 Many /LPF 


 


 





 


Urine Bacteria  0 /HPF (0-FEW)   


 


Urine Pregnancy Test   Negative (NEG)  








Laboratory Tests








Test


 4/10/20


03:44


 


White Blood Count


 8.6 x10^3/uL


(4.0-11.0)


 


Red Blood Count


 4.01 x10^6/uL


(3.50-5.40)


 


Hemoglobin


 11.4 g/dL


(12.0-15.5)


 


Hematocrit


 34.7 %


(36.0-47.0)


 


Mean Corpuscular Volume 87 fL () 


 


Mean Corpuscular Hemoglobin 28 pg (25-35) 


 


Mean Corpuscular Hemoglobin


Concent 33 g/dL


(31-37)


 


Red Cell Distribution Width


 15.8 %


(11.5-14.5)


 


Platelet Count


 168 x10^3/uL


(140-400)


 


Neutrophils (%) (Auto) 78 % (31-73) 


 


Lymphocytes (%) (Auto) 13 % (24-48) 


 


Monocytes (%) (Auto) 8 % (0-9) 


 


Eosinophils (%) (Auto) 0 % (0-3) 


 


Basophils (%) (Auto) 0 % (0-3) 


 


Neutrophils # (Auto)


 6.8 x10^3/uL


(1.8-7.7)


 


Lymphocytes # (Auto)


 1.1 x10^3/uL


(1.0-4.8)


 


Monocytes # (Auto)


 0.7 x10^3/uL


(0.0-1.1)


 


Eosinophils # (Auto)


 0.0 x10^3/uL


(0.0-0.7)


 


Basophils # (Auto)


 0.0 x10^3/uL


(0.0-0.2)


 


Sodium Level


 137 mmol/L


(136-145)


 


Potassium Level


 3.9 mmol/L


(3.5-5.1)


 


Chloride Level


 104 mmol/L


()


 


Carbon Dioxide Level


 24 mmol/L


(21-32)


 


Anion Gap 9 (6-14) 


 


Blood Urea Nitrogen 7 mg/dL (7-20) 


 


Creatinine


 0.6 mg/dL


(0.6-1.0)


 


Estimated GFR


(Cockcroft-Gault) 125.0 





 


BUN/Creatinine Ratio 12 (6-20) 


 


Glucose Level


 101 mg/dL


(70-99)


 


Calcium Level


 8.3 mg/dL


(8.5-10.1)


 


Total Bilirubin


 1.5 mg/dL


(0.2-1.0)


 


Aspartate Amino Transf


(AST/SGOT) 307 U/L


(15-37)


 


Alanine Aminotransferase


(ALT/SGPT) 264 U/L


(14-59)


 


Alkaline Phosphatase


 85 U/L


()


 


Total Protein


 6.3 g/dL


(6.4-8.2)


 


Albumin


 3.1 g/dL


(3.4-5.0)


 


Albumin/Globulin Ratio 1.0 (1.0-1.7) 








Assessment/Plan


Status post laparoscopic cholecystectomy bilirubin 1.5 LFTs mildly elevated.  We

will monitor LFTs recheck lab in a.m. if improved plan for discharge











RITA SANCHEZ MD             Apr 10, 2020 08:51

## 2020-04-11 VITALS — SYSTOLIC BLOOD PRESSURE: 98 MMHG | DIASTOLIC BLOOD PRESSURE: 46 MMHG

## 2020-04-11 VITALS — DIASTOLIC BLOOD PRESSURE: 56 MMHG | SYSTOLIC BLOOD PRESSURE: 107 MMHG

## 2020-04-11 VITALS — DIASTOLIC BLOOD PRESSURE: 47 MMHG | SYSTOLIC BLOOD PRESSURE: 106 MMHG

## 2020-04-11 VITALS — DIASTOLIC BLOOD PRESSURE: 69 MMHG | SYSTOLIC BLOOD PRESSURE: 114 MMHG

## 2020-04-11 VITALS — SYSTOLIC BLOOD PRESSURE: 109 MMHG | DIASTOLIC BLOOD PRESSURE: 63 MMHG

## 2020-04-11 LAB
ALBUMIN SERPL-MCNC: 3.4 G/DL (ref 3.4–5)
ALP SERPL-CCNC: 115 U/L (ref 46–116)
ALT SERPL-CCNC: 495 U/L (ref 14–59)
AST SERPL-CCNC: 417 U/L (ref 15–37)
BILIRUB DIRECT SERPL-MCNC: 0.7 MG/DL (ref 0–0.2)
BILIRUB SERPL-MCNC: 1.2 MG/DL (ref 0.2–1)
LIPASE: 203 U/L (ref 73–393)
PROT SERPL-MCNC: 6.9 G/DL (ref 6.4–8.2)

## 2020-04-11 RX ADMIN — OXYCODONE HYDROCHLORIDE AND ACETAMINOPHEN PRN TAB: 5; 325 TABLET ORAL at 12:11

## 2020-04-11 RX ADMIN — SODIUM CHLORIDE AND POTASSIUM CHLORIDE SCH MLS/HR: 4.5; 1.49 INJECTION, SOLUTION INTRAVENOUS at 05:59

## 2020-04-11 RX ADMIN — HYDROMORPHONE HYDROCHLORIDE PRN MG: 2 INJECTION INTRAMUSCULAR; INTRAVENOUS; SUBCUTANEOUS at 08:55

## 2020-04-11 RX ADMIN — HYDROMORPHONE HYDROCHLORIDE PRN MG: 2 INJECTION INTRAMUSCULAR; INTRAVENOUS; SUBCUTANEOUS at 05:49

## 2020-04-11 RX ADMIN — PIPERACILLIN SODIUM AND TAZOBACTAM SODIUM SCH MLS/HR: 3; .375 INJECTION, POWDER, LYOPHILIZED, FOR SOLUTION INTRAVENOUS at 05:51

## 2020-04-11 RX ADMIN — PIPERACILLIN SODIUM AND TAZOBACTAM SODIUM SCH MLS/HR: 3; .375 INJECTION, POWDER, LYOPHILIZED, FOR SOLUTION INTRAVENOUS at 12:10

## 2020-04-11 RX ADMIN — DOCUSATE SODIUM SCH MG: 100 CAPSULE, LIQUID FILLED ORAL at 09:00

## 2020-04-11 RX ADMIN — HYDROMORPHONE HYDROCHLORIDE PRN MG: 2 INJECTION INTRAMUSCULAR; INTRAVENOUS; SUBCUTANEOUS at 00:49

## 2020-04-11 RX ADMIN — ENOXAPARIN SODIUM SCH MG: 40 INJECTION SUBCUTANEOUS at 20:56

## 2020-04-11 RX ADMIN — ONDANSETRON PRN MG: 2 INJECTION INTRAMUSCULAR; INTRAVENOUS at 10:10

## 2020-04-11 RX ADMIN — OXYCODONE HYDROCHLORIDE AND ACETAMINOPHEN PRN TAB: 5; 325 TABLET ORAL at 20:55

## 2020-04-11 RX ADMIN — HYDROMORPHONE HYDROCHLORIDE PRN MG: 2 INJECTION INTRAMUSCULAR; INTRAVENOUS; SUBCUTANEOUS at 17:20

## 2020-04-11 RX ADMIN — Medication SCH CAP: at 20:55

## 2020-04-11 RX ADMIN — Medication SCH CAP: at 09:00

## 2020-04-11 RX ADMIN — PIPERACILLIN SODIUM AND TAZOBACTAM SODIUM SCH MLS/HR: 3; .375 INJECTION, POWDER, LYOPHILIZED, FOR SOLUTION INTRAVENOUS at 00:48

## 2020-04-11 RX ADMIN — DOCUSATE SODIUM SCH MG: 100 CAPSULE, LIQUID FILLED ORAL at 20:55

## 2020-04-11 RX ADMIN — PIPERACILLIN SODIUM AND TAZOBACTAM SODIUM SCH MLS/HR: 3; .375 INJECTION, POWDER, LYOPHILIZED, FOR SOLUTION INTRAVENOUS at 17:23

## 2020-04-11 RX ADMIN — SODIUM CHLORIDE AND POTASSIUM CHLORIDE SCH MLS/HR: 4.5; 1.49 INJECTION, SOLUTION INTRAVENOUS at 17:45

## 2020-04-11 RX ADMIN — ONDANSETRON PRN MG: 2 INJECTION INTRAMUSCULAR; INTRAVENOUS at 05:59

## 2020-04-11 NOTE — PDOC
PROGRESS NOTES


History of Present Illness


History of Present Illness





DICTATED and SIGNED BY:     EUSEBIO BROWN MD


DATE:     04/09/20 0343





VTE Prophylaxis Ordered


VTE Prophylaxis Devices:  No


VTE Pharmacological Prophylaxi:  Yes





Assessment/Plan


Assessment/Plan


impression








cholecystitis, acute , Cholelithiasis with gallbladder wall thickening and 

pericholecystic  fluid. common duct is at the upper limits of normal caliber. No

clear common duct stones are seen 


hepatic steatosis. 


transaminitis





pod # 3 pain and nausea not well controlled, 7/10 











plan 








admit





consult gen surgery


laproscopic  cholecystectomy


iv fluid support


inc iv dilaudid to 1mg q 4 hrs prn


ADAT


GI consult


follow lft's 


ambulate


IS








D/W RN





Vitals


Vitals





Vital Signs








  Date Time  Temp Pulse Resp B/P (MAP) Pulse Ox O2 Delivery O2 Flow Rate FiO2


 


4/11/20 10:33 97.5 74 17 109/63 (78) 97 Room Air  





 97.5       











Physical Exam


General:  Alert, Oriented X3, Cooperative, mild distress


Heart:  Regular rate, Normal S1, Normal S2


Lungs:  Clear


Abdomen:  Normal bowel sounds, Soft, Other (Mild incisional tenderness JAMILAH drain 

intact with serosanguineous drainage)


Extremities:  No cyanosis


Skin:  No rashes, No breakdown





Labs


LABS





Laboratory Tests








Test


 4/11/20


09:45


 


Total Bilirubin


 1.2 mg/dL


(0.2-1.0)


 


Direct Bilirubin


 0.7 mg/dL


(0.0-0.2)


 


Aspartate Amino Transf


(AST/SGOT) 417 U/L


(15-37)


 


Alanine Aminotransferase


(ALT/SGPT) 495 U/L


(14-59)


 


Alkaline Phosphatase


 115 U/L


()


 


Total Protein


 6.9 g/dL


(6.4-8.2)


 


Albumin


 3.4 g/dL


(3.4-5.0)


 


Lipase


 203 U/L


()











Comment


Review of Relevant


I have reviewed the following items chelsea (where applicable) has been applied.


Labs





Laboratory Tests








Test


 4/10/20


03:44 4/11/20


09:45


 


White Blood Count


 8.6 x10^3/uL


(4.0-11.0) 





 


Red Blood Count


 4.01 x10^6/uL


(3.50-5.40) 





 


Hemoglobin


 11.4 g/dL


(12.0-15.5) 





 


Hematocrit


 34.7 %


(36.0-47.0) 





 


Mean Corpuscular Volume 87 fL ()  


 


Mean Corpuscular Hemoglobin 28 pg (25-35)  


 


Mean Corpuscular Hemoglobin


Concent 33 g/dL


(31-37) 





 


Red Cell Distribution Width


 15.8 %


(11.5-14.5) 





 


Platelet Count


 168 x10^3/uL


(140-400) 





 


Neutrophils (%) (Auto) 78 % (31-73)  


 


Lymphocytes (%) (Auto) 13 % (24-48)  


 


Monocytes (%) (Auto) 8 % (0-9)  


 


Eosinophils (%) (Auto) 0 % (0-3)  


 


Basophils (%) (Auto) 0 % (0-3)  


 


Neutrophils # (Auto)


 6.8 x10^3/uL


(1.8-7.7) 





 


Lymphocytes # (Auto)


 1.1 x10^3/uL


(1.0-4.8) 





 


Monocytes # (Auto)


 0.7 x10^3/uL


(0.0-1.1) 





 


Eosinophils # (Auto)


 0.0 x10^3/uL


(0.0-0.7) 





 


Basophils # (Auto)


 0.0 x10^3/uL


(0.0-0.2) 





 


Sodium Level


 137 mmol/L


(136-145) 





 


Potassium Level


 3.9 mmol/L


(3.5-5.1) 





 


Chloride Level


 104 mmol/L


() 





 


Carbon Dioxide Level


 24 mmol/L


(21-32) 





 


Anion Gap 9 (6-14)  


 


Blood Urea Nitrogen 7 mg/dL (7-20)  


 


Creatinine


 0.6 mg/dL


(0.6-1.0) 





 


Estimated GFR


(Cockcroft-Gault) 125.0 


 





 


BUN/Creatinine Ratio 12 (6-20)  


 


Glucose Level


 101 mg/dL


(70-99) 





 


Calcium Level


 8.3 mg/dL


(8.5-10.1) 





 


Total Bilirubin


 1.5 mg/dL


(0.2-1.0) 1.2 mg/dL


(0.2-1.0)


 


Aspartate Amino Transf


(AST/SGOT) 307 U/L


(15-37) 417 U/L


(15-37)


 


Alanine Aminotransferase


(ALT/SGPT) 264 U/L


(14-59) 495 U/L


(14-59)


 


Alkaline Phosphatase


 85 U/L


() 115 U/L


()


 


Total Protein


 6.3 g/dL


(6.4-8.2) 6.9 g/dL


(6.4-8.2)


 


Albumin


 3.1 g/dL


(3.4-5.0) 3.4 g/dL


(3.4-5.0)


 


Albumin/Globulin Ratio 1.0 (1.0-1.7)  


 


Direct Bilirubin


 


 0.7 mg/dL


(0.0-0.2)


 


Lipase


 


 203 U/L


()








Laboratory Tests








Test


 4/11/20


09:45


 


Total Bilirubin


 1.2 mg/dL


(0.2-1.0)


 


Direct Bilirubin


 0.7 mg/dL


(0.0-0.2)


 


Aspartate Amino Transf


(AST/SGOT) 417 U/L


(15-37)


 


Alanine Aminotransferase


(ALT/SGPT) 495 U/L


(14-59)


 


Alkaline Phosphatase


 115 U/L


()


 


Total Protein


 6.9 g/dL


(6.4-8.2)


 


Albumin


 3.4 g/dL


(3.4-5.0)


 


Lipase


 203 U/L


()








Medications





Current Medications


Famotidine (Pepcid) 20 mg 1X  ONCE PO  Last administered on 4/9/20at 01:21;  

Start 4/9/20 at 01:30;  Stop 4/9/20 at 01:31;  Status DC


Ondansetron HCl (Zofran Odt) 4 mg 1X  ONCE PO  Last administered on 4/9/20at 

01:21;  Start 4/9/20 at 01:30;  Stop 4/9/20 at 01:31;  Status DC


Multi-Ingredient Mouthwash/Gargle (Gi Cocktail) 20 ml 1X  ONCE SWSW  Last 

administered on 4/9/20at 01:21;  Start 4/9/20 at 01:30;  Stop 4/9/20 at 01:31;  

Status DC


Fentanyl Citrate (Fentanyl 2ml Vial) 75 mcg 1X  ONCE IVP  Last administered on 

4/9/20at 01:30;  Start 4/9/20 at 01:30;  Stop 4/9/20 at 01:34;  Status DC


Piperacillin Sod/ Tazobactam Sod (Zosyn Per Pharmacy) 1 each PRN DAILY  PRN MC 

SEE COMMENTS;  Start 4/9/20 at 03:00


Piperacillin Sod/ Tazobactam Sod 3.375 gm/Sodium Chloride 50 ml @  100 mls/hr 

Q6HRS IV  Last administered on 4/11/20at 05:51;  Start 4/9/20 at 03:00


Ondansetron HCl (Zofran) 4 mg PRN Q8HRS  PRN IV NAUSEA/VOMITING 1ST CHOICE Last 

administered on 4/9/20at 03:07;  Start 4/9/20 at 03:00;  Stop 4/9/20 at 12:18;  

Status DC


Morphine Sulfate (Morphine Sulfate) 4 mg PRN Q2HR  PRN IV SEVERE PAIN 7-10 Last 

administered on 4/9/20at 05:17;  Start 4/9/20 at 03:00;  Stop 4/10/20 at 02:59; 

Status DC


Sodium Chloride 1,000 ml @  125 mls/hr Q8H IV  Last administered on 4/9/20at 

06:40;  Start 4/9/20 at 03:00;  Stop 4/9/20 at 13:50;  Status DC


Ondansetron HCl (Zofran) 4 mg PRN Q6HRS  PRN IV NAUSEA/VOMITING;  Start 4/9/20 

at 08:45;  Stop 4/9/20 at 20:00;  Status DC


Fentanyl Citrate (Fentanyl 2ml Vial) 25 mcg PRN Q5MIN  PRN IV MILD PAIN 1-3 Last

administered on 4/9/20at 12:29;  Start 4/9/20 at 08:45;  Stop 4/9/20 at 20:00;  

Status DC


Fentanyl Citrate (Fentanyl 2ml Vial) 50 mcg PRN Q5MIN  PRN IV MODERATE TO SEVERE

PAIN Last administered on 4/9/20at 12:49;  Start 4/9/20 at 08:45;  Stop 4/9/20 

at 20:00;  Status DC


Morphine Sulfate (Morphine Sulfate) 1 mg PRN Q10MIN  PRN IV SEVERE PAIN 7-10;  

Start 4/9/20 at 08:45;  Stop 4/9/20 at 20:00;  Status DC


Ringer's Solution 1,000 ml @  30 mls/hr Q24H IV ;  Start 4/9/20 at 08:43;  Stop 

4/9/20 at 13:50;  Status DC


Hydromorphone HCl (Dilaudid) 0.5 mg PRN Q10MIN  PRN IV SEV PAIN, Second choice; 

Start 4/9/20 at 08:45;  Stop 4/9/20 at 20:00;  Status DC


Prochlorperazine Edisylate (Compazine) 5 mg PACU PRN  PRN IV NAUSEA, MRX1 Last 

administered on 4/9/20at 10:19;  Start 4/9/20 at 08:45;  Stop 4/9/20 at 20:00;  

Status DC


Dexamethasone Sodium Phosphate (Decadron) 4 mg STK-MED ONCE .ROUTE ;  Start 

4/9/20 at 10:45;  Stop 4/9/20 at 10:46;  Status DC


Famotidine (Pepcid Vial) 20 mg STK-MED ONCE .ROUTE ;  Start 4/9/20 at 10:45;  

Stop 4/9/20 at 10:46;  Status DC


Ondansetron HCl (Zofran) 4 mg STK-MED ONCE .ROUTE ;  Start 4/9/20 at 10:45;  

Stop 4/9/20 at 10:46;  Status DC


Fentanyl Citrate (Fentanyl 2ml Vial) 100 mcg STK-MED ONCE .ROUTE ;  Start 4/9/20

at 10:45;  Stop 4/9/20 at 10:46;  Status DC


Midazolam HCl (Versed) 2 mg STK-MED ONCE .ROUTE ;  Start 4/9/20 at 10:45;  Stop 

4/9/20 at 10:46;  Status DC


Bupivacaine HCl/ Epinephrine Bitart (Sensorcain-Epi 0.5%-1:[id number] Mpf) 30 ml 

STK-MED ONCE .ROUTE  Last administered on 4/9/20at 11:15;  Start 4/9/20 at 10:

49;  Stop 4/9/20 at 10:49;  Status DC


Glucagon (Glucagen) 1 mg STK-MED ONCE .ROUTE  Last administered on 4/9/20at 

11:34;  Start 4/9/20 at 10:49;  Stop 4/9/20 at 10:49;  Status DC


Iohexol (Omnipaque 300 Mg/ml) 50 ml STK-MED ONCE .ROUTE  Last administered on 

4/9/20at 11:15;  Start 4/9/20 at 10:49;  Stop 4/9/20 at 10:49;  Status DC


Cellulose (Surgicel Hemostat 4x8) 1 each STK-MED ONCE .ROUTE  Last administered 

on 4/9/20at 11:49;  Start 4/9/20 at 10:49;  Stop 4/9/20 at 10:49;  Status DC


Glycopyrrolate (Robinul) 1 mg STK-MED ONCE .ROUTE ;  Start 4/9/20 at 11:37;  

Stop 4/9/20 at 11:37;  Status DC


Neostigmine Bromide (Neostigmine Methylsulfate) 5 mg STK-MED ONCE .ROUTE ;  

Start 4/9/20 at 11:37;  Stop 4/9/20 at 11:37;  Status DC


Sevoflurane (Ultane) 60 ml STK-MED ONCE IH ;  Start 4/9/20 at 11:58;  Stop 

4/9/20 at 11:58;  Status DC


Enoxaparin Sodium (Lovenox 40mg Syringe) 40 mg Q24H SQ  Last administered on 

4/9/20at 21:51;  Start 4/9/20 at 21:00


Sodium Chloride (Normal Saline Flush) 3 ml QSHIFT  PRN IV AFTER MEDS AND BLOOD 

DRAWS;  Start 4/9/20 at 12:15;  Stop 4/9/20 at 15:45;  Status DC


Potassium Chloride/Sodium Chloride 1,000 ml @  80 mls/hr [id number] IV  Last 

administered on 4/11/20at 05:59;  Start 4/9/20 at 12:08


Dextrose (Dextrose 50%-Water Syringe) 12.5 gm PRN Q15MIN  PRN IV SEE COMMENTS;  

Start 4/9/20 at 12:15


Oxycodone/ Acetaminophen (Percocet 5/325) 1 tab PRN Q4HRS  PRN PO MODERATE-

SEVERE PAIN Last administered on 4/10/20at 05:43;  Start 4/9/20 at 12:15


Naloxone HCl (Narcan) 0.4 mg PRN Q2MIN  PRN IV SEE INSTRUCTIONS;  Start 4/9/20 

at 12:15


Sodium Chloride 1,000 ml @  25 mls/hr Q24H IV ;  Start 4/9/20 at 12:08;  Stop 

4/9/20 at 13:50;  Status DC


Hydromorphone HCl (Dilaudid) 0.5 mg PRN Q3HRS  PRN IV MODERATE PAIN Last 

administered on 4/10/20at 08:36;  Start 4/9/20 at 12:15


Docusate Sodium (Colace) 100 mg BID PO  Last administered on 4/11/20at 09:00;  

Start 4/9/20 at 21:00


Ondansetron HCl (Zofran) 4 mg PRN Q6HRS  PRN IVP NAUESA, 1ST CHOICE;  Start 

4/9/20 at 12:15;  Stop 4/10/20 at 11:11;  Status DC


Sodium Chloride (Normal Saline Flush) 3 ml QSHIFT  PRN IV AFTER MEDS AND BLOOD 

DRAWS;  Start 4/9/20 at 12:30


Sodium Chloride 1,000 ml @  100 mls/hr Q10H IV ;  Start 4/9/20 at 12:21;  Stop 

4/10/20 at 11:53;  Status DC


Ondansetron HCl (Zofran) 4 mg PRN Q4HRS  PRN IV NAUSEA/VOMITING Last 

administered on 4/11/20at 10:10;  Start 4/9/20 at 12:30


Acetaminophen (Tylenol) 650 mg PRN Q4HRS  PRN PO TEMP OVER 100.4F OR MILD PAIN; 

Start 4/9/20 at 12:30


Acetaminophen (Tylenol Supp) 650 mg PRN Q4HRS  PRN WI TEMP OVER 100.4F OR MILD 

PAIN;  Start 4/9/20 at 12:30


Clonidine HCl (Catapres) 0.1 mg PRN Q6HRS  PRN PO SBP>160 OR DBP>90;  Start 

4/9/20 at 12:30


Sodium Monofluorophosphate (Fleet Adult) 133 ml PRN DAILY  PRN WI CONSTIPATION; 

Start 4/9/20 at 12:30


Docusate Sodium (Colace) 100 mg PRN BID  PRN PO CONSTIPATION;  Start 4/9/20 at 

12:30


Albuterol Sulfate (Ventolin Neb Soln) 2.5 mg PRN Q4HRS  PRN NEB SHORTNESS OF 

BREATH;  Start 4/9/20 at 12:30


Guaifenesin (Robitussin) 200 mg PRN Q4HRS  PRN PO COUGH;  Start 4/9/20 at 12:30


Enoxaparin Sodium (Lovenox 40mg Syringe) 40 mg Q24H SQ ;  Start 4/9/20 at 13:00;

 Stop 4/9/20 at 13:50;  Status DC


Fentanyl Citrate (Fentanyl 2ml Vial) 100 mcg STK-MED ONCE .ROUTE ;  Start 4/9/20

at 12:46;  Stop 4/9/20 at 12:46;  Status DC


Enoxaparin Sodium (Lovenox 40mg Syringe) 40 mg Q24H SQ ;  Start 4/9/20 at 21:00;

 Status UNV


Prochlorperazine (Compazine) 25 mg PRN Q8HRS  PRN WI NAUSEA/VOMITING Last ad

ministered on 4/9/20at 16:11;  Start 4/9/20 at 15:45


Hydromorphone HCl (Dilaudid) 1 mg PRN Q3HRS  PRN IVP SEVERE PAIN Last admin

istered on 4/11/20at 08:55;  Start 4/10/20 at 10:45


Lactobacillus Rhamnosus (Culturelle) 1 cap BID PO  Last administered on 4/11 /20at 09:00;  Start 4/10/20 at 21:00


Diphenhydramine HCl (Benadryl) 25 mg PRN Q6HRS  PRN PO ITCHING Last administered

on 4/11/20at 10:09;  Start 4/11/20 at 10:00


Vitals/I & O





Vital Sign - Last 24 Hours








 4/10/20 4/10/20 4/10/20 4/10/20





 11:42 15:24 16:15 16:42


 


Temp  98.0  





  98.0  


 


Pulse  69  


 


Resp  14  


 


B/P (MAP)  103/45 (64)  


 


Pulse Ox  98  


 


O2 Delivery Room Air Room Air Room Air Room Air


 


    





    





 4/10/20 4/10/20 4/10/20 4/10/20





 19:00 20:00 20:00 20:29


 


Temp 98.6   





 98.6   


 


Pulse 72   


 


Resp 20   


 


B/P (MAP) 113/56 (75)   


 


Pulse Ox 98   


 


O2 Delivery Room Air Room Air Room Air Room Air


 


    





    





 4/10/20 4/11/20 4/11/20 4/11/20





 23:00 00:49 01:24 03:00


 


Temp 98.4   98.0





 98.4   98.0


 


Pulse 72   72


 


Resp 20   20


 


B/P (MAP) 106/42 (63)   106/47 (66)


 


Pulse Ox 98   96


 


O2 Delivery Room Air Room Air Room Air Room Air


 


    





    





 4/11/20 4/11/20 4/11/20 4/11/20





 05:49 06:23 07:15 08:00


 


Temp   98.2 





   98.2 


 


Pulse   92 


 


Resp   16 


 


B/P (MAP)   107/56 (73) 


 


Pulse Ox   95 


 


O2 Delivery Room Air Room Air Room Air Room Air


 


    





    





 4/11/20 4/11/20 4/11/20 





 08:55 09:25 10:33 


 


Temp   97.5 





   97.5 


 


Pulse   74 


 


Resp 18 18 17 


 


B/P (MAP)   109/63 (78) 


 


Pulse Ox 95 95 97 


 


O2 Delivery Room Air Room Air Room Air 














Intake and Output   


 


 4/10/20 4/10/20 4/11/20





 15:00 23:00 07:00


 


Intake Total 330 ml 350 ml 250 ml


 


Output Total 55 ml 15 ml 250 ml


 


Balance 275 ml 335 ml 0 ml

















RITA JOHNSON MD          Apr 11, 2020 11:15

## 2020-04-11 NOTE — PDOC
SURGICAL PROGRESS NOTE


Subjective


Patient feeling much better this morning


Vital Signs





Vital Signs








  Date Time  Temp Pulse Resp B/P (MAP) Pulse Ox O2 Delivery O2 Flow Rate FiO2


 


4/11/20 07:15 98.2 92 16 107/56 (73) 95 Room Air  





 98.2       








I&O











Intake and Output 


 


 4/11/20





 07:00


 


Intake Total 930 ml


 


Output Total 320 ml


 


Balance 610 ml


 


 


 


Intake Oral 830 ml


 


IV Total 100 ml


 


Output Drainage Total 320 ml


 


# Voids 4








PATIENT HAS A NARVAEZ:  No


General:  Alert, Oriented X3, Cooperative, mild distress


Abdomen:  Normal bowel sounds, Soft, Other (Mild incisional tenderness JAMILAH drain 

intact with serosanguineous drainage)


Labs





Laboratory Tests








Test


 4/9/20


08:49 4/10/20


03:44


 


Urine Pregnancy Test Negative (NEG)  


 


White Blood Count


 


 8.6 x10^3/uL


(4.0-11.0)


 


Red Blood Count


 


 4.01 x10^6/uL


(3.50-5.40)


 


Hemoglobin


 


 11.4 g/dL


(12.0-15.5)


 


Hematocrit


 


 34.7 %


(36.0-47.0)


 


Mean Corpuscular Volume  87 fL () 


 


Mean Corpuscular Hemoglobin  28 pg (25-35) 


 


Mean Corpuscular Hemoglobin


Concent 


 33 g/dL


(31-37)


 


Red Cell Distribution Width


 


 15.8 %


(11.5-14.5)


 


Platelet Count


 


 168 x10^3/uL


(140-400)


 


Neutrophils (%) (Auto)  78 % (31-73) 


 


Lymphocytes (%) (Auto)  13 % (24-48) 


 


Monocytes (%) (Auto)  8 % (0-9) 


 


Eosinophils (%) (Auto)  0 % (0-3) 


 


Basophils (%) (Auto)  0 % (0-3) 


 


Neutrophils # (Auto)


 


 6.8 x10^3/uL


(1.8-7.7)


 


Lymphocytes # (Auto)


 


 1.1 x10^3/uL


(1.0-4.8)


 


Monocytes # (Auto)


 


 0.7 x10^3/uL


(0.0-1.1)


 


Eosinophils # (Auto)


 


 0.0 x10^3/uL


(0.0-0.7)


 


Basophils # (Auto)


 


 0.0 x10^3/uL


(0.0-0.2)


 


Sodium Level


 


 137 mmol/L


(136-145)


 


Potassium Level


 


 3.9 mmol/L


(3.5-5.1)


 


Chloride Level


 


 104 mmol/L


()


 


Carbon Dioxide Level


 


 24 mmol/L


(21-32)


 


Anion Gap  9 (6-14) 


 


Blood Urea Nitrogen  7 mg/dL (7-20) 


 


Creatinine


 


 0.6 mg/dL


(0.6-1.0)


 


Estimated GFR


(Cockcroft-Gault) 


 125.0 





 


BUN/Creatinine Ratio  12 (6-20) 


 


Glucose Level


 


 101 mg/dL


(70-99)


 


Calcium Level


 


 8.3 mg/dL


(8.5-10.1)


 


Total Bilirubin


 


 1.5 mg/dL


(0.2-1.0)


 


Aspartate Amino Transf


(AST/SGOT) 


 307 U/L


(15-37)


 


Alanine Aminotransferase


(ALT/SGPT) 


 264 U/L


(14-59)


 


Alkaline Phosphatase


 


 85 U/L


()


 


Total Protein


 


 6.3 g/dL


(6.4-8.2)


 


Albumin


 


 3.1 g/dL


(3.4-5.0)


 


Albumin/Globulin Ratio  1.0 (1.0-1.7) 








Assessment/Plan


Status post laparoscopic cholecystectomy.  Awaiting today's labs if improved 

could be discharged home, would remove drain prior to discharge











RITA SANCHEZ MD             Apr 11, 2020 08:32

## 2020-04-12 VITALS — DIASTOLIC BLOOD PRESSURE: 58 MMHG | SYSTOLIC BLOOD PRESSURE: 114 MMHG

## 2020-04-12 VITALS — DIASTOLIC BLOOD PRESSURE: 78 MMHG | SYSTOLIC BLOOD PRESSURE: 119 MMHG

## 2020-04-12 VITALS — DIASTOLIC BLOOD PRESSURE: 63 MMHG | SYSTOLIC BLOOD PRESSURE: 105 MMHG

## 2020-04-12 LAB
ALBUMIN SERPL-MCNC: 3.1 G/DL (ref 3.4–5)
ALP SERPL-CCNC: 112 U/L (ref 46–116)
ALT SERPL-CCNC: 421 U/L (ref 14–59)
AST SERPL-CCNC: 251 U/L (ref 15–37)
BILIRUB DIRECT SERPL-MCNC: 0.5 MG/DL (ref 0–0.2)
BILIRUB SERPL-MCNC: 0.9 MG/DL (ref 0.2–1)
PROT SERPL-MCNC: 6 G/DL (ref 6.4–8.2)

## 2020-04-12 RX ADMIN — DOCUSATE SODIUM SCH MG: 100 CAPSULE, LIQUID FILLED ORAL at 08:13

## 2020-04-12 RX ADMIN — ONDANSETRON PRN MG: 2 INJECTION INTRAMUSCULAR; INTRAVENOUS at 06:51

## 2020-04-12 RX ADMIN — HYDROMORPHONE HYDROCHLORIDE PRN MG: 2 INJECTION INTRAMUSCULAR; INTRAVENOUS; SUBCUTANEOUS at 00:32

## 2020-04-12 RX ADMIN — PIPERACILLIN SODIUM AND TAZOBACTAM SODIUM SCH MLS/HR: 3; .375 INJECTION, POWDER, LYOPHILIZED, FOR SOLUTION INTRAVENOUS at 12:00

## 2020-04-12 RX ADMIN — PIPERACILLIN SODIUM AND TAZOBACTAM SODIUM SCH MLS/HR: 3; .375 INJECTION, POWDER, LYOPHILIZED, FOR SOLUTION INTRAVENOUS at 05:32

## 2020-04-12 RX ADMIN — Medication SCH CAP: at 08:13

## 2020-04-12 RX ADMIN — OXYCODONE HYDROCHLORIDE AND ACETAMINOPHEN PRN TAB: 5; 325 TABLET ORAL at 06:51

## 2020-04-12 RX ADMIN — OXYCODONE HYDROCHLORIDE AND ACETAMINOPHEN PRN TAB: 5; 325 TABLET ORAL at 12:33

## 2020-04-12 RX ADMIN — SODIUM CHLORIDE AND POTASSIUM CHLORIDE SCH MLS/HR: 4.5; 1.49 INJECTION, SOLUTION INTRAVENOUS at 08:03

## 2020-04-12 RX ADMIN — PIPERACILLIN SODIUM AND TAZOBACTAM SODIUM SCH MLS/HR: 3; .375 INJECTION, POWDER, LYOPHILIZED, FOR SOLUTION INTRAVENOUS at 00:31

## 2020-04-12 NOTE — PDOC2
CONSULT


Date of Consult


Date of Consult


DATE: 4/12/20 


TIME: 12:43





Reason for Consult


Reason for Consult:


Normal transaminases after cholecystectomy





History of Present Illness


Reason for Visit:


This is a 24-year-old female who recently presented with acute cholecystitis and

had her gallbladder removed on Thursday. The ultrasound preop showed gallstones,

and diffuse fatty liver. There were mild elevations of her transaminases at that

time and she gives a history that during her pregnancy was told that her liver 

tests were mildly elevated but no further treatment or follow-up was performed. 

She has no family history of liver disease and does not drink alcohol or take 

any obvious hepatotoxic medications





Her gallbladder removal was uneventful and she had a normal intraoperative 

cholangiogram showing no clear evidence of a common bile duct stone. However her

transaminases were elevated in the 2400 range with relatively normal bilirubin 

and alkaline phosphatase. She remained in that range but today has shown some 

improvement. Although she had some postoperative nausea,  that has resolved and 

she is tolerating a bland diet without abdominal pain. She has no fever or 

jaundice.





Past Medical History


Cardiovascular:  No pertinent hx


GI:  No pertinent hx





Past Surgical History


Past Surgical History:  Cholecystectomy (on this admission), No pertinent 

history





Family History


Family History:  Hypertension, Other (noncontributory )





Social History


No


ALCOHOL:  none


Drugs:  None





Current Medications


Current Medications





Current Medications


Famotidine (Pepcid) 20 mg 1X  ONCE PO  Last administered on 4/9/20at 01:21;  

Start 4/9/20 at 01:30;  Stop 4/9/20 at 01:31;  Status DC


Ondansetron HCl (Zofran Odt) 4 mg 1X  ONCE PO  Last administered on 4/9/20at 

01:21;  Start 4/9/20 at 01:30;  Stop 4/9/20 at 01:31;  Status DC


Multi-Ingredient Mouthwash/Gargle (Gi Cocktail) 20 ml 1X  ONCE SWSW  Last 

administered on 4/9/20at 01:21;  Start 4/9/20 at 01:30;  Stop 4/9/20 at 01:31;  

Status DC


Fentanyl Citrate (Fentanyl 2ml Vial) 75 mcg 1X  ONCE IVP  Last administered on 

4/9/20at 01:30;  Start 4/9/20 at 01:30;  Stop 4/9/20 at 01:34;  Status DC


Piperacillin Sod/ Tazobactam Sod (Zosyn Per Pharmacy) 1 each PRN DAILY  PRN MC 

SEE COMMENTS;  Start 4/9/20 at 03:00


Piperacillin Sod/ Tazobactam Sod 3.375 gm/Sodium Chloride 50 ml @  100 mls/hr 

Q6HRS IV  Last administered on 4/12/20at 05:32;  Start 4/9/20 at 03:00


Ondansetron HCl (Zofran) 4 mg PRN Q8HRS  PRN IV NAUSEA/VOMITING 1ST CHOICE Last 

administered on 4/9/20at 03:07;  Start 4/9/20 at 03:00;  Stop 4/9/20 at 12:18;  

Status DC


Morphine Sulfate (Morphine Sulfate) 4 mg PRN Q2HR  PRN IV SEVERE PAIN 7-10 Last 

administered on 4/9/20at 05:17;  Start 4/9/20 at 03:00;  Stop 4/10/20 at 02:59; 

Status DC


Sodium Chloride 1,000 ml @  125 mls/hr Q8H IV  Last administered on 4/9/20at 

06:40;  Start 4/9/20 at 03:00;  Stop 4/9/20 at 13:50;  Status DC


Ondansetron HCl (Zofran) 4 mg PRN Q6HRS  PRN IV NAUSEA/VOMITING;  Start 4/9/20 

at 08:45;  Stop 4/9/20 at 20:00;  Status DC


Fentanyl Citrate (Fentanyl 2ml Vial) 25 mcg PRN Q5MIN  PRN IV MILD PAIN 1-3 Last

administered on 4/9/20at 12:29;  Start 4/9/20 at 08:45;  Stop 4/9/20 at 20:00;  

Status DC


Fentanyl Citrate (Fentanyl 2ml Vial) 50 mcg PRN Q5MIN  PRN IV MODERATE TO SEVERE

PAIN Last administered on 4/9/20at 12:49;  Start 4/9/20 at 08:45;  Stop 4/9/20 

at 20:00;  Status DC


Morphine Sulfate (Morphine Sulfate) 1 mg PRN Q10MIN  PRN IV SEVERE PAIN 7-10;  

Start 4/9/20 at 08:45;  Stop 4/9/20 at 20:00;  Status DC


Ringer's Solution 1,000 ml @  30 mls/hr Q24H IV ;  Start 4/9/20 at 08:43;  Stop 

4/9/20 at 13:50;  Status DC


Hydromorphone HCl (Dilaudid) 0.5 mg PRN Q10MIN  PRN IV SEV PAIN, Second choice; 

Start 4/9/20 at 08:45;  Stop 4/9/20 at 20:00;  Status DC


Prochlorperazine Edisylate (Compazine) 5 mg PACU PRN  PRN IV NAUSEA, MRX1 Last 

administered on 4/9/20at 10:19;  Start 4/9/20 at 08:45;  Stop 4/9/20 at 20:00;  

Status DC


Dexamethasone Sodium Phosphate (Decadron) 4 mg STK-MED ONCE .ROUTE ;  Start 

4/9/20 at 10:45;  Stop 4/9/20 at 10:46;  Status DC


Famotidine (Pepcid Vial) 20 mg STK-MED ONCE .ROUTE ;  Start 4/9/20 at 10:45;  

Stop 4/9/20 at 10:46;  Status DC


Ondansetron HCl (Zofran) 4 mg STK-MED ONCE .ROUTE ;  Start 4/9/20 at 10:45;  

Stop 4/9/20 at 10:46;  Status DC


Fentanyl Citrate (Fentanyl 2ml Vial) 100 mcg STK-MED ONCE .ROUTE ;  Start 4/9/20

at 10:45;  Stop 4/9/20 at 10:46;  Status DC


Midazolam HCl (Versed) 2 mg STK-MED ONCE .ROUTE ;  Start 4/9/20 at 10:45;  Stop 

4/9/20 at 10:46;  Status DC


Bupivacaine HCl/ Epinephrine Bitart (Sensorcain-Epi 0.5%-1:377959 Mpf) 30 ml 

STK-MED ONCE .ROUTE  Last administered on 4/9/20at 11:15;  Start 4/9/20 at 

10:49;  Stop 4/9/20 at 10:49;  Status DC


Glucagon (Glucagen) 1 mg STK-MED ONCE .ROUTE  Last administered on 4/9/20at 

11:34;  Start 4/9/20 at 10:49;  Stop 4/9/20 at 10:49;  Status DC


Iohexol (Omnipaque 300 Mg/ml) 50 ml STK-MED ONCE .ROUTE  Last administered on 

4/9/20at 11:15;  Start 4/9/20 at 10:49;  Stop 4/9/20 at 10:49;  Status DC


Cellulose (Surgicel Hemostat 4x8) 1 each STK-MED ONCE .ROUTE  Last administered 

on 4/9/20at 11:49;  Start 4/9/20 at 10:49;  Stop 4/9/20 at 10:49;  Status DC


Glycopyrrolate (Robinul) 1 mg STK-MED ONCE .ROUTE ;  Start 4/9/20 at 11:37;  

Stop 4/9/20 at 11:37;  Status DC


Neostigmine Bromide (Neostigmine Methylsulfate) 5 mg STK-MED ONCE .ROUTE ;  

Start 4/9/20 at 11:37;  Stop 4/9/20 at 11:37;  Status DC


Sevoflurane (Ultane) 60 ml STK-MED ONCE IH ;  Start 4/9/20 at 11:58;  Stop 

4/9/20 at 11:58;  Status DC


Enoxaparin Sodium (Lovenox 40mg Syringe) 40 mg Q24H SQ  Last administered on 

4/11/20at 20:56;  Start 4/9/20 at 21:00


Sodium Chloride (Normal Saline Flush) 3 ml QSHIFT  PRN IV AFTER MEDS AND BLOOD 

DRAWS;  Start 4/9/20 at 12:15;  Stop 4/9/20 at 15:45;  Status DC


Potassium Chloride/Sodium Chloride 1,000 ml @  80 mls/hr [id number] IV  Last 

administered on 4/12/20at 08:03;  Start 4/9/20 at 12:08


Dextrose (Dextrose 50%-Water Syringe) 12.5 gm PRN Q15MIN  PRN IV SEE COMMENTS;  

Start 4/9/20 at 12:15


Oxycodone/ Acetaminophen (Percocet 5/325) 1 tab PRN Q4HRS  PRN PO MODERATE-

SEVERE PAIN Last administered on 4/12/20at 12:33;  Start 4/9/20 at 12:15


Naloxone HCl (Narcan) 0.4 mg PRN Q2MIN  PRN IV SEE INSTRUCTIONS;  Start 4/9/20 

at 12:15


Sodium Chloride 1,000 ml @  25 mls/hr Q24H IV ;  Start 4/9/20 at 12:08;  Stop 

4/9/20 at 13:50;  Status DC


Hydromorphone HCl (Dilaudid) 0.5 mg PRN Q3HRS  PRN IV MODERATE PAIN Last 

administered on 4/10/20at 08:36;  Start 4/9/20 at 12:15


Docusate Sodium (Colace) 100 mg BID PO  Last administered on 4/12/20at 08:13;  

Start 4/9/20 at 21:00


Ondansetron HCl (Zofran) 4 mg PRN Q6HRS  PRN IVP NAUESA, 1ST CHOICE;  Start 

4/9/20 at 12:15;  Stop 4/10/20 at 11:11;  Status DC


Sodium Chloride (Normal Saline Flush) 3 ml QSHIFT  PRN IV AFTER MEDS AND BLOOD 

DRAWS;  Start 4/9/20 at 12:30


Sodium Chloride 1,000 ml @  100 mls/hr Q10H IV ;  Start 4/9/20 at 12:21;  Stop 

4/10/20 at 11:53;  Status DC


Ondansetron HCl (Zofran) 4 mg PRN Q4HRS  PRN IV NAUSEA/VOMITING Last 

administered on 4/12/20at 06:51;  Start 4/9/20 at 12:30


Acetaminophen (Tylenol) 650 mg PRN Q4HRS  PRN PO TEMP OVER 100.4F OR MILD PAIN; 

Start 4/9/20 at 12:30


Acetaminophen (Tylenol Supp) 650 mg PRN Q4HRS  PRN MA TEMP OVER 100.4F OR MILD P

AIN;  Start 4/9/20 at 12:30


Clonidine HCl (Catapres) 0.1 mg PRN Q6HRS  PRN PO SBP>160 OR DBP>90;  Start 

4/9/20 at 12:30


Sodium Monofluorophosphate (Fleet Adult) 133 ml PRN DAILY  PRN MA CONSTIPATION; 

Start 4/9/20 at 12:30


Docusate Sodium (Colace) 100 mg PRN BID  PRN PO CONSTIPATION;  Start 4/9/20 at 

12:30


Albuterol Sulfate (Ventolin Neb Soln) 2.5 mg PRN Q4HRS  PRN NEB SHORTNESS OF 

BREATH;  Start 4/9/20 at 12:30


Guaifenesin (Robitussin) 200 mg PRN Q4HRS  PRN PO COUGH;  Start 4/9/20 at 12:30


Enoxaparin Sodium (Lovenox 40mg Syringe) 40 mg Q24H SQ ;  Start 4/9/20 at 13:00;

 Stop 4/9/20 at 13:50;  Status DC


Fentanyl Citrate (Fentanyl 2ml Vial) 100 mcg STK-MED ONCE .ROUTE ;  Start 4/9/20

at 12:46;  Stop 4/9/20 at 12:46;  Status DC


Enoxaparin Sodium (Lovenox 40mg Syringe) 40 mg Q24H SQ ;  Start 4/9/20 at 21:00;

 Status UNV


Prochlorperazine (Compazine) 25 mg PRN Q8HRS  PRN MA NAUSEA/VOMITING Last 

administered on 4/9/20at 16:11;  Start 4/9/20 at 15:45


Hydromorphone HCl (Dilaudid) 1 mg PRN Q3HRS  PRN IVP SEVERE PAIN Last 

administered on 4/12/20at 00:32;  Start 4/10/20 at 10:45


Lactobacillus Rhamnosus (Culturelle) 1 cap BID PO  Last administered on 

4/12/20at 08:13;  Start 4/10/20 at 21:00


Diphenhydramine HCl (Benadryl) 25 mg PRN Q6HRS  PRN PO ITCHING Last administered

on 4/11/20at 17:47;  Start 4/11/20 at 10:00





Active Scripts


Active


No Active Prescriptions or Reported Medications





Allergies


Allergies:  


Coded Allergies:  


     No Known Drug Allergies (Unverified , 4/9/20)





Physical Exam


General:  Alert, Oriented X3, Cooperative


HEENT:  Atraumatic, PERRLA


Lungs:  Clear to auscultation


Heart:  Regular rate, Normal S1, Normal S2, No murmurs


Abdomen:  Normal bowel sounds, Soft, No tenderness, No hepatosplenomegaly, No 

masses


Skin:  No rashes


Psych/Mental Status:  Mental status NL





Vitals


VITALS





Vital Signs








  Date Time  Temp Pulse Resp B/P (MAP) Pulse Ox O2 Delivery O2 Flow Rate FiO2


 


4/12/20 12:33   16   Room Air  


 


4/12/20 10:33 98.4 70  119/78 (92) 98   





 98.4       











Labs


Labs





Laboratory Tests








Test


 4/11/20


09:45 4/12/20


06:00


 


Total Bilirubin


 1.2 mg/dL


(0.2-1.0) 0.9 mg/dL


(0.2-1.0)


 


Direct Bilirubin


 0.7 mg/dL


(0.0-0.2) 0.5 mg/dL


(0.0-0.2)


 


Aspartate Amino Transf


(AST/SGOT) 417 U/L


(15-37) 251 U/L


(15-37)


 


Alanine Aminotransferase


(ALT/SGPT) 495 U/L


(14-59) 421 U/L


(14-59)


 


Alkaline Phosphatase


 115 U/L


() 112 U/L


()


 


Total Protein


 6.9 g/dL


(6.4-8.2) 6.0 g/dL


(6.4-8.2)


 


Albumin


 3.4 g/dL


(3.4-5.0) 3.1 g/dL


(3.4-5.0)


 


Lipase


 203 U/L


() 











Laboratory Tests








Test


 4/12/20


06:00


 


Total Bilirubin


 0.9 mg/dL


(0.2-1.0)


 


Direct Bilirubin


 0.5 mg/dL


(0.0-0.2)


 


Aspartate Amino Transf


(AST/SGOT) 251 U/L


(15-37)


 


Alanine Aminotransferase


(ALT/SGPT) 421 U/L


(14-59)


 


Alkaline Phosphatase


 112 U/L


()


 


Total Protein


 6.0 g/dL


(6.4-8.2)


 


Albumin


 3.1 g/dL


(3.4-5.0)











Images


Images


Preop US  Showing Cholelithiasis and Fatty Liver


Intraoperative cholangiogram showing no evidence for persistent common bile duct

stone





Assessment/Plan


Assessment/Plan


Abnormal transaminases.    She has a history of mild elevations in the past 

which in retrospect are probably related to fatty liver. The dramatic increase 

post cholecystectomy however is different for her. Her only risk factor is the 

surgery itself. The possibility of a retained stone seems unlikely with a n

egative IOC. I guess it is  still a possibility but the more likely scenario is 

mild ischemic changes from surgery itself due to possible decreased in 

splanchnic blood flow that is reported during some cases of laparoscopy and 

pneumoperitoneum. In that setting, one would expect slow improvement in the 

transaminases and a persistence of fairly normal bilirubin and alkaline 

phosphatase, which is  the pattern she is demonstrated.


Hepatitis screen was ordered but is not yet available but would seem unlikely to

explain these complaints.





She is clinically improving and is tolerating a diet, I concur with the plans 

for discharge. I did encourage her to follow up with a repeat of her liver 

function studies in the next week or so to confirm that they're gradually im

proving and to consider monitoring  them in the future.





 I also advised her that if she develops increasing nausea or abdominal pain or 

develops jaundice that she needs to return  for evaluation of the possibility of

unrecognized bile duct stone or stricture.  She understood those 

recommendations.











PHYLLIS AGUAYO MD               Apr 12, 2020 12:53

## 2020-04-12 NOTE — PDOC
[facility] PROGRESS NOTE


Chief Complaint


Chief Complaint


Postop day 4 laparoscopic cholecystectomy


cholecystitis, acute , Cholelithiasis with gallbladder wall thickening and 

pericholecystic  fluid. common duct is at the upper limits of normal caliber. No

clear common duct stones are seen 


hepatic steatosis. 


transaminitis





History of Present Illness


History of Present Illness


[id number]


She is seen and examined


She is at her baseline


We'll discharge


I left a prescription for Norco





Vitals/I&O


Vitals/I&O:





                                   Vital Signs








  Date Time  Temp Pulse Resp B/P (MAP) Pulse Ox O2 Delivery O2 Flow Rate FiO2


 


4/12/20 12:33   16   Room Air  


 


4/12/20 10:33 98.4 70  119/78 (92) 98   





 98.4       














                                    I & O   


 


 4/11/20 4/11/20 4/12/20





 15:00 23:00 07:00


 


Intake Total 360 ml 200 ml 


 


Output Total  30 ml 50 ml


 


Balance 360 ml 170 ml -50 ml











Physical Exam


General:  Alert, Oriented X3, Cooperative, No acute distress


Heart:  Regular rate, Normal S1, Normal S2


Lungs:  Clear


Abdomen:  Normal bowel sounds, Soft, No tenderness, Other (Wounds clean dry and 

intact JAMILAH drain with serosanguineous output)


Extremities:  No cyanosis


Skin:  No rashes, No breakdown





Labs


Labs:





Laboratory Tests








Test


 4/12/20


06:00


 


Total Bilirubin


 0.9 mg/dL


(0.2-1.0)


 


Direct Bilirubin


 0.5 mg/dL


(0.0-0.2)


 


Aspartate Amino Transf


(AST/SGOT) 251 U/L


(15-37)


 


Alanine Aminotransferase


(ALT/SGPT) 421 U/L


(14-59)


 


Alkaline Phosphatase


 112 U/L


()


 


Total Protein


 6.0 g/dL


(6.4-8.2)


 


Albumin


 3.1 g/dL


(3.4-5.0)











Assessment and Plan


Assessmemt and Plan


Postop day 4 laparoscopic cholecystectomy


cholecystitis, acute , Cholelithiasis with gallbladder wall thickening and 

pericholecystic  fluid. common duct is at the upper limits of normal caliber. No

clear common duct stones are seen 


hepatic steatosis. 


transaminitis








Plan


Discharge





Comment


Review of Relevant


I have reviewed the following items chelsea (where applicable) has been applied.











LINDA JIMENEZ III DO           Apr 12, 2020 12:54

## 2020-04-12 NOTE — DS
DATE OF DISCHARGE:  04/12/2020



ADMISSION DIAGNOSIS:  Gallstones.



DISCHARGE DIAGNOSIS:  Postop day #4 laparoscopic cholecystectomy.



HOSPITAL COURSE:  The patient is a pleasant 24-year-old female who presented

with gallstones and cholecystitis.  She was admitted.  We gave her IV

antibiotics and consulted General Surgery.  She was taken for a laparoscopic

cholecystectomy.  Postprocedure, she did well.



PHYSICAL EXAMINATION:  This morning, I saw her and examined her.

HEART:  Tones are normal.

LUNGS:  Clear.

ABDOMEN:  Soft.

EXTREMITIES:  No edema.



We plan to discharge.



DISPOSITION:  Home.



ACTIVITY:  As tolerated.



DIET:  Low sodium.



MEDICATIONS:  Please see MRAD.



TOTAL TIME:  32 minutes.

 



______________________________

LINDA JIMENEZ DO



DR:  ALEXIS/jono  JOB#:  [id number] / [id number]

DD:  04/12/2020 12:55  DT:  04/12/2020 16:04

## 2020-04-12 NOTE — PDOC
SURGICAL PROGRESS NOTE


Subjective


Patient doing well tolerating diet


Vital Signs





Vital Signs








  Date Time  Temp Pulse Resp B/P (MAP) Pulse Ox O2 Delivery O2 Flow Rate FiO2


 


4/12/20 08:10   16   Room Air  


 


4/12/20 07:29 98.6 68  114/58 (76) 99   





 98.6       








I&O











Intake and Output 


 


 4/12/20





 07:00


 


Intake Total 560 ml


 


Output Total 80 ml


 


Balance 480 ml


 


 


 


Intake Oral 560 ml


 


Output Drainage Total 80 ml


 


# Voids 5








PATIENT HAS A NARVAEZ:  No


General:  Alert, Oriented X3, Cooperative, No acute distress


Abdomen:  Normal bowel sounds, Soft, No tenderness, Other (Wounds clean dry and 

intact JAMILAH drain with serosanguineous output)


Labs





Laboratory Tests








Test


 4/11/20


09:45 4/12/20


06:00


 


Total Bilirubin


 1.2 mg/dL


(0.2-1.0) 0.9 mg/dL


(0.2-1.0)


 


Direct Bilirubin


 0.7 mg/dL


(0.0-0.2) 0.5 mg/dL


(0.0-0.2)


 


Aspartate Amino Transf


(AST/SGOT) 417 U/L


(15-37) 251 U/L


(15-37)


 


Alanine Aminotransferase


(ALT/SGPT) 495 U/L


(14-59) 421 U/L


(14-59)


 


Alkaline Phosphatase


 115 U/L


() 112 U/L


()


 


Total Protein


 6.9 g/dL


(6.4-8.2) 6.0 g/dL


(6.4-8.2)


 


Albumin


 3.4 g/dL


(3.4-5.0) 3.1 g/dL


(3.4-5.0)


 


Lipase


 203 U/L


() 











Laboratory Tests








Test


 4/11/20


09:45 4/12/20


06:00


 


Total Bilirubin


 1.2 mg/dL


(0.2-1.0) 0.9 mg/dL


(0.2-1.0)


 


Direct Bilirubin


 0.7 mg/dL


(0.0-0.2) 0.5 mg/dL


(0.0-0.2)


 


Aspartate Amino Transf


(AST/SGOT) 417 U/L


(15-37) 251 U/L


(15-37)


 


Alanine Aminotransferase


(ALT/SGPT) 495 U/L


(14-59) 421 U/L


(14-59)


 


Alkaline Phosphatase


 115 U/L


() 112 U/L


()


 


Total Protein


 6.9 g/dL


(6.4-8.2) 6.0 g/dL


(6.4-8.2)


 


Albumin


 3.4 g/dL


(3.4-5.0) 3.1 g/dL


(3.4-5.0)


 


Lipase


 203 U/L


() 











Assessment/Plan


Status post laparoscopic cholecystectomy doing well labs improving


Remove JAMILAH drain


Okay to discharge home from surgical standpoint, follow-up with Dr. Mendez in 2 

weeks


No lifting more than 20 pounds for 2 weeks low-fat diet for 2 weeks











RITA ASNCHEZ MD             Apr 12, 2020 09:32